# Patient Record
Sex: FEMALE | Race: WHITE | Employment: STUDENT | URBAN - METROPOLITAN AREA
[De-identification: names, ages, dates, MRNs, and addresses within clinical notes are randomized per-mention and may not be internally consistent; named-entity substitution may affect disease eponyms.]

---
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
## 2024-03-27 ENCOUNTER — APPOINTMENT (OUTPATIENT)
Dept: OBSTETRICS AND GYNECOLOGY | Facility: CLINIC | Age: 19
End: 2024-03-27
Payer: COMMERCIAL

## 2024-04-12 ENCOUNTER — APPOINTMENT (OUTPATIENT)
Dept: OBSTETRICS AND GYNECOLOGY | Facility: CLINIC | Age: 19
End: 2024-04-12
Payer: COMMERCIAL

## 2024-04-25 ENCOUNTER — INITIAL PRENATAL (OUTPATIENT)
Dept: OBSTETRICS AND GYNECOLOGY | Facility: CLINIC | Age: 19
End: 2024-04-25
Payer: COMMERCIAL

## 2024-04-25 ENCOUNTER — APPOINTMENT (OUTPATIENT)
Dept: OBSTETRICS AND GYNECOLOGY | Facility: CLINIC | Age: 19
End: 2024-04-25
Payer: COMMERCIAL

## 2024-04-25 VITALS — BODY MASS INDEX: 23.79 KG/M2 | WEIGHT: 138.6 LBS | DIASTOLIC BLOOD PRESSURE: 63 MMHG | SYSTOLIC BLOOD PRESSURE: 120 MMHG

## 2024-04-25 DIAGNOSIS — Z34.01 PRIMIGRAVIDA IN FIRST TRIMESTER (HHS-HCC): ICD-10-CM

## 2024-04-25 DIAGNOSIS — O21.9 NAUSEA AND VOMITING IN PREGNANCY (HHS-HCC): ICD-10-CM

## 2024-04-25 DIAGNOSIS — Z3A.12 12 WEEKS GESTATION OF PREGNANCY (HHS-HCC): ICD-10-CM

## 2024-04-25 PROCEDURE — 0500F INITIAL PRENATAL CARE VISIT: CPT | Performed by: MIDWIFE

## 2024-04-25 ASSESSMENT — EDINBURGH POSTNATAL DEPRESSION SCALE (EPDS)
I HAVE BEEN ABLE TO LAUGH AND SEE THE FUNNY SIDE OF THINGS: AS MUCH AS I ALWAYS COULD
I HAVE BEEN SO UNHAPPY THAT I HAVE HAD DIFFICULTY SLEEPING: NOT AT ALL
I HAVE BEEN ANXIOUS OR WORRIED FOR NO GOOD REASON: HARDLY EVER
TOTAL SCORE: 1
I HAVE FELT SAD OR MISERABLE: NO, NOT AT ALL
I HAVE FELT SCARED OR PANICKY FOR NO GOOD REASON: NO, NOT AT ALL
I HAVE LOOKED FORWARD WITH ENJOYMENT TO THINGS: AS MUCH AS I EVER DID
THE THOUGHT OF HARMING MYSELF HAS OCCURRED TO ME: NEVER
THINGS HAVE BEEN GETTING ON TOP OF ME: NO, I HAVE BEEN COPING AS WELL AS EVER
I HAVE BLAMED MYSELF UNNECESSARILY WHEN THINGS WENT WRONG: NO, NEVER
I HAVE BEEN SO UNHAPPY THAT I HAVE BEEN CRYING: NO, NEVER

## 2024-04-25 NOTE — PROGRESS NOTES
"S: Presents to office to establish prenatal care with partner, Hermes. They are excited about this pregnancy. Denies h/ o gynecologic surgeries/procedures. Taking PNVs.    Symptoms: Fatigue, nausea with rare vomiting.  LMP: 1/28/2024 --> Not exact, patient reports it was \"sometime that week\"    O: See flow sheets    A: 18yo G1 at 12.4 per unsure LMP     P: Oriented to collaborative practice, visit schedule, [facility] delivery      New OB folder with proof of pregnancy/dental letters      [facility] info/application provided      New OB labs ordered      Dating US      Discussed genetic options --> Will consider      Discussed comfort measures for n/v in pregnancy      Pap not indicated r/t age      RTO in 4 weeks and sooner PRN      Next visit: Review labs, dating US, genetics?  "

## 2024-04-30 ENCOUNTER — HOSPITAL ENCOUNTER (OUTPATIENT)
Dept: RADIOLOGY | Facility: CLINIC | Age: 19
Discharge: HOME | End: 2024-04-30
Payer: MEDICAID

## 2024-04-30 DIAGNOSIS — Z3A.12 12 WEEKS GESTATION OF PREGNANCY (HHS-HCC): ICD-10-CM

## 2024-04-30 PROCEDURE — 76801 OB US < 14 WKS SINGLE FETUS: CPT | Performed by: STUDENT IN AN ORGANIZED HEALTH CARE EDUCATION/TRAINING PROGRAM

## 2024-04-30 PROCEDURE — 76801 OB US < 14 WKS SINGLE FETUS: CPT

## 2024-05-01 ENCOUNTER — DOCUMENTATION (OUTPATIENT)
Dept: OBSTETRICS AND GYNECOLOGY | Facility: HOSPITAL | Age: 19
End: 2024-05-01
Payer: COMMERCIAL

## 2024-05-03 ENCOUNTER — LAB (OUTPATIENT)
Dept: LAB | Facility: LAB | Age: 19
End: 2024-05-03
Payer: COMMERCIAL

## 2024-05-03 ENCOUNTER — LAB REQUISITION (OUTPATIENT)
Dept: LAB | Facility: HOSPITAL | Age: 19
End: 2024-05-03
Payer: COMMERCIAL

## 2024-05-03 DIAGNOSIS — Z3A.12 12 WEEKS GESTATION OF PREGNANCY (HHS-HCC): ICD-10-CM

## 2024-05-03 LAB
ABO GROUP (TYPE) IN BLOOD: NORMAL
ANTIBODY SCREEN: NORMAL
ERYTHROCYTE [DISTWIDTH] IN BLOOD BY AUTOMATED COUNT: 11.9 % (ref 11.5–14.5)
HCT VFR BLD AUTO: 38.9 % (ref 36–46)
HGB BLD-MCNC: 13.1 G/DL (ref 12–16)
MCH RBC QN AUTO: 31.3 PG (ref 26–34)
MCHC RBC AUTO-ENTMCNC: 33.7 G/DL (ref 32–36)
MCV RBC AUTO: 93 FL (ref 80–100)
NRBC BLD-RTO: 0 /100 WBCS (ref 0–0)
PLATELET # BLD AUTO: 190 X10*3/UL (ref 150–450)
RBC # BLD AUTO: 4.18 X10*6/UL (ref 4–5.2)
RH FACTOR (ANTIGEN D): NORMAL
WBC # BLD AUTO: 9.4 X10*3/UL (ref 4.4–11.3)

## 2024-05-03 PROCEDURE — 86803 HEPATITIS C AB TEST: CPT

## 2024-05-03 PROCEDURE — 86850 RBC ANTIBODY SCREEN: CPT

## 2024-05-03 PROCEDURE — 36415 COLL VENOUS BLD VENIPUNCTURE: CPT

## 2024-05-03 PROCEDURE — 86901 BLOOD TYPING SEROLOGIC RH(D): CPT

## 2024-05-03 PROCEDURE — 87800 DETECT AGNT MULT DNA DIREC: CPT

## 2024-05-03 PROCEDURE — 83020 HEMOGLOBIN ELECTROPHORESIS: CPT | Performed by: MIDWIFE

## 2024-05-03 PROCEDURE — 86780 TREPONEMA PALLIDUM: CPT

## 2024-05-03 PROCEDURE — 85027 COMPLETE CBC AUTOMATED: CPT

## 2024-05-03 PROCEDURE — 86900 BLOOD TYPING SEROLOGIC ABO: CPT

## 2024-05-03 PROCEDURE — 87389 HIV-1 AG W/HIV-1&-2 AB AG IA: CPT

## 2024-05-03 PROCEDURE — 87661 TRICHOMONAS VAGINALIS AMPLIF: CPT

## 2024-05-03 PROCEDURE — 86317 IMMUNOASSAY INFECTIOUS AGENT: CPT

## 2024-05-03 PROCEDURE — 83021 HEMOGLOBIN CHROMOTOGRAPHY: CPT

## 2024-05-03 PROCEDURE — 87340 HEPATITIS B SURFACE AG IA: CPT

## 2024-05-04 LAB
C TRACH RRNA SPEC QL NAA+PROBE: NEGATIVE
HBV SURFACE AG SERPL QL IA: NONREACTIVE
HCV AB SER QL: NONREACTIVE
HIV 1+2 AB+HIV1 P24 AG SERPL QL IA: NONREACTIVE
N GONORRHOEA DNA SPEC QL PROBE+SIG AMP: NEGATIVE
REFLEX ADDED, ANEMIA PANEL: NORMAL
RUBV IGG SERPL IA-ACNC: 3.9 IA
RUBV IGG SERPL QL IA: POSITIVE
T VAGINALIS RRNA SPEC QL NAA+PROBE: NEGATIVE
TREPONEMA PALLIDUM IGG+IGM AB [PRESENCE] IN SERUM OR PLASMA BY IMMUNOASSAY: NONREACTIVE

## 2024-05-07 LAB
HEMOGLOBIN A2: 2.8 % (ref 2–3.5)
HEMOGLOBIN A: 97 % (ref 95.8–98)
HEMOGLOBIN F: 0.2 % (ref 0–2)
HEMOGLOBIN IDENTIFICATION INTERPRETATION: NORMAL
PATH REVIEW-HGB IDENTIFICATION: NORMAL

## 2024-05-08 ENCOUNTER — APPOINTMENT (OUTPATIENT)
Dept: RADIOLOGY | Facility: CLINIC | Age: 19
End: 2024-05-08
Payer: COMMERCIAL

## 2024-05-15 ENCOUNTER — HOSPITAL ENCOUNTER (OUTPATIENT)
Dept: RADIOLOGY | Facility: CLINIC | Age: 19
Discharge: HOME | End: 2024-05-15
Payer: MEDICAID

## 2024-05-15 DIAGNOSIS — Z3A.12 12 WEEKS GESTATION OF PREGNANCY (HHS-HCC): ICD-10-CM

## 2024-05-15 PROCEDURE — 76813 OB US NUCHAL MEAS 1 GEST: CPT | Performed by: STUDENT IN AN ORGANIZED HEALTH CARE EDUCATION/TRAINING PROGRAM

## 2024-05-15 PROCEDURE — 76813 OB US NUCHAL MEAS 1 GEST: CPT

## 2024-05-23 ENCOUNTER — ROUTINE PRENATAL (OUTPATIENT)
Dept: OBSTETRICS AND GYNECOLOGY | Facility: CLINIC | Age: 19
End: 2024-05-23
Payer: COMMERCIAL

## 2024-05-23 ENCOUNTER — APPOINTMENT (OUTPATIENT)
Dept: LAB | Facility: LAB | Age: 19
End: 2024-05-23
Payer: COMMERCIAL

## 2024-05-23 VITALS — WEIGHT: 138.2 LBS | SYSTOLIC BLOOD PRESSURE: 104 MMHG | BODY MASS INDEX: 23.72 KG/M2 | DIASTOLIC BLOOD PRESSURE: 60 MMHG

## 2024-05-23 DIAGNOSIS — Z3A.14 14 WEEKS GESTATION OF PREGNANCY (HHS-HCC): ICD-10-CM

## 2024-05-23 DIAGNOSIS — Z34.02 PRIMIGRAVIDA IN SECOND TRIMESTER (HHS-HCC): Primary | ICD-10-CM

## 2024-05-23 PROBLEM — Z34.00 PRIMIGRAVIDA, ANTEPARTUM (HHS-HCC): Status: ACTIVE | Noted: 2024-05-23

## 2024-05-23 NOTE — PROGRESS NOTES
S: MARIELLA Denies vb, lof, abdominal pain, cramping. Requests NIPS.    O: See flow sheets    A: 18yo G1 at 14.6 per 11.4 week US    P: Reviewed BP, weight      Reviewed WNL labs      Reviewed/discussed WNL NT scan      NIPS ordered with instructions --> wants to know gender!      Anatomy scan scheduled 6/25      RTO in 4 weeks and sooner PRN

## 2024-06-07 LAB — SCAN RESULT: NORMAL

## 2024-06-20 ENCOUNTER — APPOINTMENT (OUTPATIENT)
Dept: OBSTETRICS AND GYNECOLOGY | Facility: CLINIC | Age: 19
End: 2024-06-20
Payer: COMMERCIAL

## 2024-06-20 VITALS — WEIGHT: 141.8 LBS | BODY MASS INDEX: 24.34 KG/M2 | DIASTOLIC BLOOD PRESSURE: 67 MMHG | SYSTOLIC BLOOD PRESSURE: 98 MMHG

## 2024-06-20 DIAGNOSIS — Z3A.18 18 WEEKS GESTATION OF PREGNANCY (HHS-HCC): ICD-10-CM

## 2024-06-20 DIAGNOSIS — Z34.00 PRIMIGRAVIDA, ANTEPARTUM (HHS-HCC): Primary | ICD-10-CM

## 2024-06-20 PROBLEM — O26.899 RH NEGATIVE STATE IN ANTEPARTUM PERIOD (HHS-HCC): Status: ACTIVE | Noted: 2024-06-20

## 2024-06-20 PROBLEM — Z67.91 RH NEGATIVE STATE IN ANTEPARTUM PERIOD (HHS-HCC): Status: ACTIVE | Noted: 2024-06-20

## 2024-06-20 PROCEDURE — 0501F PRENATAL FLOW SHEET: CPT | Performed by: MIDWIFE

## 2024-06-20 NOTE — PROGRESS NOTES
S: MARIELLA Denies vb, lof, abdominal pain. Endorses flutters! Reports constipation.    O: See flow sheets    A: 20yo G1 at 18.6 per 11.4 week US    P: Reviewed BP, weight      Reviewed/discussed rrNIPS --> It's a BOY!!      Discussed comfort measures for constipation in pregnancy      Anatomy scan scheduled 6/25      RTO in 4 weeks and sooner PRN      Next visit: Review anatomy

## 2024-06-25 ENCOUNTER — HOSPITAL ENCOUNTER (OUTPATIENT)
Dept: RADIOLOGY | Facility: CLINIC | Age: 19
Discharge: HOME | End: 2024-06-25
Payer: COMMERCIAL

## 2024-06-25 DIAGNOSIS — Z3A.12 12 WEEKS GESTATION OF PREGNANCY (HHS-HCC): ICD-10-CM

## 2024-06-25 PROCEDURE — 76811 OB US DETAILED SNGL FETUS: CPT

## 2024-06-25 PROCEDURE — 76811 OB US DETAILED SNGL FETUS: CPT | Performed by: MEDICAL GENETICS

## 2024-07-18 ENCOUNTER — APPOINTMENT (OUTPATIENT)
Dept: OBSTETRICS AND GYNECOLOGY | Facility: CLINIC | Age: 19
End: 2024-07-18
Payer: COMMERCIAL

## 2024-07-18 VITALS — BODY MASS INDEX: 25.64 KG/M2 | SYSTOLIC BLOOD PRESSURE: 113 MMHG | WEIGHT: 149.4 LBS | DIASTOLIC BLOOD PRESSURE: 74 MMHG

## 2024-07-18 DIAGNOSIS — Z3A.22 22 WEEKS GESTATION OF PREGNANCY (HHS-HCC): ICD-10-CM

## 2024-07-18 DIAGNOSIS — Z34.00 PRIMIGRAVIDA, ANTEPARTUM (HHS-HCC): Primary | ICD-10-CM

## 2024-07-18 DIAGNOSIS — Z67.91 RH NEGATIVE STATE IN ANTEPARTUM PERIOD (HHS-HCC): ICD-10-CM

## 2024-07-18 DIAGNOSIS — O26.899 RH NEGATIVE STATE IN ANTEPARTUM PERIOD (HHS-HCC): ICD-10-CM

## 2024-07-18 PROCEDURE — 0501F PRENATAL FLOW SHEET: CPT | Performed by: MIDWIFE

## 2024-07-18 NOTE — PROGRESS NOTES
S: MARIELLA Denies vb, lof, abdominal pain, cramping. Endorses a lot of movement!    O: See flow sheets    A: 18yo G1 at 22.6 per 11.4 week US      RH negative    P: Reviewed BP, weight      Reviewed/discussed WNL anatomy      Second trimester expectations      RTO in 4 weeks and sooner PRN      Next visit: Order 28 week labs with blood type for rhogam

## 2024-08-15 ENCOUNTER — APPOINTMENT (OUTPATIENT)
Dept: OBSTETRICS AND GYNECOLOGY | Facility: CLINIC | Age: 19
End: 2024-08-15
Payer: COMMERCIAL

## 2024-08-15 VITALS — WEIGHT: 156 LBS | BODY MASS INDEX: 26.78 KG/M2 | DIASTOLIC BLOOD PRESSURE: 72 MMHG | SYSTOLIC BLOOD PRESSURE: 108 MMHG

## 2024-08-15 DIAGNOSIS — O26.899 RH NEGATIVE STATE IN ANTEPARTUM PERIOD (HHS-HCC): ICD-10-CM

## 2024-08-15 DIAGNOSIS — Z3A.26 26 WEEKS GESTATION OF PREGNANCY (HHS-HCC): ICD-10-CM

## 2024-08-15 DIAGNOSIS — Z34.00 PRIMIGRAVIDA, ANTEPARTUM (HHS-HCC): Primary | ICD-10-CM

## 2024-08-15 DIAGNOSIS — Z67.91 RH NEGATIVE STATE IN ANTEPARTUM PERIOD (HHS-HCC): ICD-10-CM

## 2024-08-15 PROCEDURE — 0501F PRENATAL FLOW SHEET: CPT | Performed by: MIDWIFE

## 2024-08-15 ASSESSMENT — EDINBURGH POSTNATAL DEPRESSION SCALE (EPDS)
THINGS HAVE BEEN GETTING ON TOP OF ME: NO, I HAVE BEEN COPING AS WELL AS EVER
I HAVE LOOKED FORWARD WITH ENJOYMENT TO THINGS: AS MUCH AS I EVER DID
I HAVE BEEN SO UNHAPPY THAT I HAVE BEEN CRYING: NO, NEVER
I HAVE BEEN ABLE TO LAUGH AND SEE THE FUNNY SIDE OF THINGS: AS MUCH AS I ALWAYS COULD
I HAVE FELT SCARED OR PANICKY FOR NO GOOD REASON: NO, NOT AT ALL
I HAVE BEEN SO UNHAPPY THAT I HAVE HAD DIFFICULTY SLEEPING: NOT AT ALL
THE THOUGHT OF HARMING MYSELF HAS OCCURRED TO ME: NEVER
I HAVE BLAMED MYSELF UNNECESSARILY WHEN THINGS WENT WRONG: NOT VERY OFTEN
TOTAL SCORE: 2
I HAVE FELT SAD OR MISERABLE: NO, NOT AT ALL
I HAVE BEEN ANXIOUS OR WORRIED FOR NO GOOD REASON: HARDLY EVER

## 2024-08-15 NOTE — PROGRESS NOTES
S: NAYELI. Denies SOL/ROM. Endorses good fetal movement.    O: See flow sheets    A: 20yo G1 at 26.6 per 11.4 week US      RH negative    P: Reviewed BP, weight      28 week folder provided, discussed      Answering service # provided, discussed      28 week labs ordered with instructions      Plans for L/D: Epidural if needed/breastfeeding (Has a pump!)/Yes circ, unknown pediatrician      Pediatrician list provided      Postpartum birth control: Undecided. Counseling provided. Instructed to alert us if she desires a device for postpartum placement      Counseled regarding rhogam and tdap      NAYELI in 2 weeks for rhogam and tdap (patient agreeable)

## 2024-08-19 ENCOUNTER — LAB (OUTPATIENT)
Dept: LAB | Facility: LAB | Age: 19
End: 2024-08-19
Payer: COMMERCIAL

## 2024-08-19 DIAGNOSIS — Z3A.26 26 WEEKS GESTATION OF PREGNANCY (HHS-HCC): ICD-10-CM

## 2024-08-19 LAB
ABO GROUP (TYPE) IN BLOOD: NORMAL
ANTIBODY SCREEN: NORMAL
ERYTHROCYTE [DISTWIDTH] IN BLOOD BY AUTOMATED COUNT: 12.2 % (ref 11.5–14.5)
GLUCOSE 1H P 50 G GLC PO SERPL-MCNC: 152 MG/DL
HCT VFR BLD AUTO: 38.1 % (ref 36–46)
HGB BLD-MCNC: 12.5 G/DL (ref 12–16)
MCH RBC QN AUTO: 31.8 PG (ref 26–34)
MCHC RBC AUTO-ENTMCNC: 32.8 G/DL (ref 32–36)
MCV RBC AUTO: 97 FL (ref 80–100)
NRBC BLD-RTO: 0 /100 WBCS (ref 0–0)
PLATELET # BLD AUTO: 150 X10*3/UL (ref 150–450)
RBC # BLD AUTO: 3.93 X10*6/UL (ref 4–5.2)
RH FACTOR (ANTIGEN D): NORMAL
WBC # BLD AUTO: 11.7 X10*3/UL (ref 4.4–11.3)

## 2024-08-19 PROCEDURE — 86901 BLOOD TYPING SEROLOGIC RH(D): CPT

## 2024-08-19 PROCEDURE — 86780 TREPONEMA PALLIDUM: CPT

## 2024-08-19 PROCEDURE — 86900 BLOOD TYPING SEROLOGIC ABO: CPT

## 2024-08-19 PROCEDURE — 82947 ASSAY GLUCOSE BLOOD QUANT: CPT

## 2024-08-19 PROCEDURE — 36415 COLL VENOUS BLD VENIPUNCTURE: CPT

## 2024-08-19 PROCEDURE — 86850 RBC ANTIBODY SCREEN: CPT

## 2024-08-19 PROCEDURE — 85027 COMPLETE CBC AUTOMATED: CPT

## 2024-08-20 DIAGNOSIS — R73.09 GLUCOSE TOLERANCE TEST ABNORMAL: Primary | ICD-10-CM

## 2024-08-20 LAB
REFLEX ADDED, ANEMIA PANEL: NORMAL
TREPONEMA PALLIDUM IGG+IGM AB [PRESENCE] IN SERUM OR PLASMA BY IMMUNOASSAY: NONREACTIVE

## 2024-08-21 ENCOUNTER — LAB (OUTPATIENT)
Dept: LAB | Facility: LAB | Age: 19
End: 2024-08-21
Payer: COMMERCIAL

## 2024-08-21 DIAGNOSIS — R73.09 GLUCOSE TOLERANCE TEST ABNORMAL: ICD-10-CM

## 2024-08-21 LAB
GLUCOSE 1H P 100 G GLC PO SERPL-MCNC: 184 MG/DL
GLUCOSE 2H P 100 G GLC PO SERPL-MCNC: 79 MG/DL
GLUCOSE 3H P 100 G GLC PO SERPL-MCNC: 40 MG/DL
GLUCOSE P FAST SERPL-MCNC: 66 MG/DL

## 2024-08-21 PROCEDURE — 82951 GLUCOSE TOLERANCE TEST (GTT): CPT

## 2024-08-21 PROCEDURE — 36415 COLL VENOUS BLD VENIPUNCTURE: CPT

## 2024-08-21 PROCEDURE — 82950 GLUCOSE TEST: CPT

## 2024-08-21 PROCEDURE — 82947 ASSAY GLUCOSE BLOOD QUANT: CPT

## 2024-08-21 PROCEDURE — 82952 GTT-ADDED SAMPLES: CPT

## 2024-08-29 ENCOUNTER — APPOINTMENT (OUTPATIENT)
Dept: OBSTETRICS AND GYNECOLOGY | Facility: CLINIC | Age: 19
End: 2024-08-29
Payer: COMMERCIAL

## 2024-08-29 VITALS — WEIGHT: 161.6 LBS | BODY MASS INDEX: 27.74 KG/M2 | SYSTOLIC BLOOD PRESSURE: 108 MMHG | DIASTOLIC BLOOD PRESSURE: 73 MMHG

## 2024-08-29 DIAGNOSIS — Z67.91 RH NEGATIVE STATE IN ANTEPARTUM PERIOD (HHS-HCC): Primary | ICD-10-CM

## 2024-08-29 DIAGNOSIS — O26.899 RH NEGATIVE STATE IN ANTEPARTUM PERIOD (HHS-HCC): Primary | ICD-10-CM

## 2024-08-29 NOTE — PROGRESS NOTES
Patient presents for Rhogam injection. Patient received rhogam in right glut. Patient tolerated well.  Lot#[id number]  EXP:09/21/2026  NDC:[id number]

## 2024-09-12 ENCOUNTER — APPOINTMENT (OUTPATIENT)
Dept: OBSTETRICS AND GYNECOLOGY | Facility: CLINIC | Age: 19
End: 2024-09-12
Payer: COMMERCIAL

## 2024-09-12 ENCOUNTER — ROUTINE PRENATAL (OUTPATIENT)
Dept: OBSTETRICS AND GYNECOLOGY | Facility: CLINIC | Age: 19
End: 2024-09-12
Payer: COMMERCIAL

## 2024-09-12 VITALS — DIASTOLIC BLOOD PRESSURE: 81 MMHG | SYSTOLIC BLOOD PRESSURE: 114 MMHG | BODY MASS INDEX: 28.43 KG/M2 | WEIGHT: 165.6 LBS

## 2024-09-12 DIAGNOSIS — Z71.85 VACCINE COUNSELING: ICD-10-CM

## 2024-09-12 DIAGNOSIS — Z34.03 ENCOUNTER FOR SUPERVISION OF NORMAL FIRST PREGNANCY IN THIRD TRIMESTER (HHS-HCC): ICD-10-CM

## 2024-09-12 DIAGNOSIS — Z3A.30 30 WEEKS GESTATION OF PREGNANCY (HHS-HCC): Primary | ICD-10-CM

## 2024-09-12 DIAGNOSIS — Z23 NEED FOR VACCINATION: ICD-10-CM

## 2024-09-12 PROCEDURE — 90715 TDAP VACCINE 7 YRS/> IM: CPT

## 2024-09-12 PROCEDURE — 90471 IMMUNIZATION ADMIN: CPT

## 2024-09-12 PROCEDURE — 0501F PRENATAL FLOW SHEET: CPT

## 2024-09-12 NOTE — PROGRESS NOTES
Subjective     Stacie Alston is a 19 y.o.  at 30w6d with a working estimated date of delivery of 11/15/2024, by Ultrasound who presents for a routine prenatal visit. She denies vaginal bleeding, leakage of fluid, decreased fetal movements, or contractions.    Patient reports overall feeling well. Patient scheduled for bump to bundle class this weekend.     Stacie Alston was counseled on the recommendation for and benefits of TDaP vaccination during pregnancy.  We discussed the passive immunity that occurs after maternal vaccination during pregnancy, and the antibodies that cross the placenta to the fetus to protect baby in the first few months of life.  Babies do not receive their first vaccination for pertussis/whooping cough until 2 months of life, during which the baby is vulnerable to this bacterial infection.  Whooping cough can cause severe and even life-threatening infections, and maternal vaccination between 27 and 36 weeks of pregnancy is the best method to protect baby from Whooping cough until they are eligible for the vaccination. After discussion the patient accepted the vaccine. >5 minutes were spent on counseling related to vaccine recommendations and safety    Patient received Rhogam       Her pregnancy is complicated by:  Medical Problems       Problem List       Primigravida, antepartum ([facility])    Rh negative state in antepartum period ([facility])          Objective   Weight: 75.1 kg (165 lb 9.6 oz)  TW.1 kg (35 lb 9.6 oz)  Pregravid BMI: 22.30    BP: 114/81          Prenatal Labs:  Lab Results   Component Value Date    HGB 12.5 2024    HCT 38.1 2024     2024    ABO A 2024    LABRH NEG 2024    NEISSGONOAMP Negative 2024    CHLAMTRACAMP Negative 2024    SYPHT Nonreactive 2024    HEPBSAG Nonreactive 2024    HIV1X2 Nonreactive 2024    URINECULTURE NO GROWTH 2023       Assessment/Plan   Birth preferences given to  patient today- collect at next visit  Review of one hour-elevated, 3 hour wnl. No anemia noted on cbc  Patient counseled on tdap and vaccine given today  Patient counseled regarding RSV vaccine and vis sent through [employer]   Reviewed s/sx of PTL, warning signs, fetal movement counts, and when to call provider  Follow up in 2 weeks for a routine prenatal visit.    Hailey Chapman, KENRICK-RENAE

## 2024-09-17 ENCOUNTER — APPOINTMENT (OUTPATIENT)
Dept: OBSTETRICS AND GYNECOLOGY | Facility: CLINIC | Age: 19
End: 2024-09-17
Payer: COMMERCIAL

## 2024-09-25 ENCOUNTER — CLINICAL SUPPORT (OUTPATIENT)
Dept: OBSTETRICS AND GYNECOLOGY | Facility: CLINIC | Age: 19
End: 2024-09-25
Payer: MEDICAID

## 2024-09-25 VITALS — DIASTOLIC BLOOD PRESSURE: 72 MMHG | WEIGHT: 166 LBS | SYSTOLIC BLOOD PRESSURE: 112 MMHG | BODY MASS INDEX: 28.49 KG/M2

## 2024-09-25 DIAGNOSIS — Z34.00 PRIMIGRAVIDA, ANTEPARTUM (HHS-HCC): ICD-10-CM

## 2024-09-25 DIAGNOSIS — O26.899 RH NEGATIVE STATE IN ANTEPARTUM PERIOD (HHS-HCC): ICD-10-CM

## 2024-09-25 DIAGNOSIS — Z23 ENCOUNTER FOR VACCINATION: ICD-10-CM

## 2024-09-25 DIAGNOSIS — Z67.91 RH NEGATIVE STATE IN ANTEPARTUM PERIOD (HHS-HCC): ICD-10-CM

## 2024-09-26 ENCOUNTER — APPOINTMENT (OUTPATIENT)
Dept: OBSTETRICS AND GYNECOLOGY | Facility: CLINIC | Age: 19
End: 2024-09-26
Payer: COMMERCIAL

## 2024-09-26 VITALS — WEIGHT: 166.4 LBS | SYSTOLIC BLOOD PRESSURE: 108 MMHG | BODY MASS INDEX: 28.56 KG/M2 | DIASTOLIC BLOOD PRESSURE: 74 MMHG

## 2024-09-26 DIAGNOSIS — Z34.03 PRIMIGRAVIDA IN THIRD TRIMESTER (HHS-HCC): Primary | ICD-10-CM

## 2024-09-26 DIAGNOSIS — Z3A.32 32 WEEKS GESTATION OF PREGNANCY (HHS-HCC): ICD-10-CM

## 2024-09-26 PROCEDURE — 0501F PRENATAL FLOW SHEET: CPT | Performed by: MIDWIFE

## 2024-09-26 NOTE — PROGRESS NOTES
S: NAYELI. Denies sol/rom. Endorses good fetal movement.    O: See flow sheets    A: 18yo G1 at 32.6 per 11.4 week US    P: Reviewed BP, weight      Flu Vaccine: Declined      Discussed third trimester expectations, comfort measures      Birth preferences reviewed together and collected      RTO in 2 weeks and sooner PRN

## 2024-10-09 ENCOUNTER — APPOINTMENT (OUTPATIENT)
Dept: OBSTETRICS AND GYNECOLOGY | Facility: CLINIC | Age: 19
End: 2024-10-09
Payer: COMMERCIAL

## 2024-10-09 VITALS — BODY MASS INDEX: 29.25 KG/M2 | WEIGHT: 170.4 LBS | SYSTOLIC BLOOD PRESSURE: 113 MMHG | DIASTOLIC BLOOD PRESSURE: 70 MMHG

## 2024-10-09 DIAGNOSIS — Z34.03 PRIMIGRAVIDA IN THIRD TRIMESTER (HHS-HCC): Primary | ICD-10-CM

## 2024-10-09 DIAGNOSIS — Z3A.34 34 WEEKS GESTATION OF PREGNANCY (HHS-HCC): ICD-10-CM

## 2024-10-09 NOTE — PROGRESS NOTES
S: NAYELI. Denies sol/rom. Endorses good fetal movement and mild jr anderson that come and go.    O: See flow sheets    A: 18yo G1 at 34.5 per 11.4 week US    P: Reviewed BP, weight      Discussed normal physiologic changes in pregnancy, comfort measures      Encouraged hydration      RTO in 2 weeks and sooner PRN      Next visit: Consent, GBS (no pcn allergy)

## 2024-10-10 ENCOUNTER — APPOINTMENT (OUTPATIENT)
Dept: OBSTETRICS AND GYNECOLOGY | Facility: CLINIC | Age: 19
End: 2024-10-10
Payer: COMMERCIAL

## 2024-10-24 ENCOUNTER — APPOINTMENT (OUTPATIENT)
Dept: OBSTETRICS AND GYNECOLOGY | Facility: CLINIC | Age: 19
End: 2024-10-24
Payer: COMMERCIAL

## 2024-10-24 VITALS — WEIGHT: 173.6 LBS | DIASTOLIC BLOOD PRESSURE: 69 MMHG | BODY MASS INDEX: 29.8 KG/M2 | SYSTOLIC BLOOD PRESSURE: 114 MMHG

## 2024-10-24 DIAGNOSIS — Z34.03 PRIMIGRAVIDA IN THIRD TRIMESTER (HHS-HCC): Primary | ICD-10-CM

## 2024-10-24 DIAGNOSIS — Z3A.36 36 WEEKS GESTATION OF PREGNANCY (HHS-HCC): ICD-10-CM

## 2024-10-24 PROCEDURE — 0501F PRENATAL FLOW SHEET: CPT | Performed by: MIDWIFE

## 2024-10-24 PROCEDURE — 87081 CULTURE SCREEN ONLY: CPT

## 2024-10-24 PROCEDURE — 87077 CULTURE AEROBIC IDENTIFY: CPT

## 2024-10-24 NOTE — PROGRESS NOTES
S: MARIELLA Denies sol/rom. Endorses good FM.     O: See flow sheets    A: 20yo G1 at 36.6 per 11.4 week US    P: Reviewed BP, weight      E-consent signed      GBS done --> no pcn allergy      Counseled rrIOL vs spontaneous labor. Patient strongly desires awaiting spontaneous labor at this time      RTO in 1 week and sooner PRN      Next visit: Review GBS

## 2024-10-27 LAB — GP B STREP GENITAL QL CULT: ABNORMAL

## 2024-10-31 ENCOUNTER — APPOINTMENT (OUTPATIENT)
Dept: OBSTETRICS AND GYNECOLOGY | Facility: CLINIC | Age: 19
End: 2024-10-31
Payer: COMMERCIAL

## 2024-10-31 VITALS — WEIGHT: 177.6 LBS | SYSTOLIC BLOOD PRESSURE: 103 MMHG | DIASTOLIC BLOOD PRESSURE: 70 MMHG | BODY MASS INDEX: 30.48 KG/M2

## 2024-10-31 DIAGNOSIS — O09.513 PRIMIGRAVIDA OF ADVANCED MATERNAL AGE IN THIRD TRIMESTER (HHS-HCC): ICD-10-CM

## 2024-10-31 DIAGNOSIS — Z3A.37 37 WEEKS GESTATION OF PREGNANCY (HHS-HCC): Primary | ICD-10-CM

## 2024-10-31 PROCEDURE — 0501F PRENATAL FLOW SHEET: CPT | Performed by: MIDWIFE

## 2024-11-07 ENCOUNTER — APPOINTMENT (OUTPATIENT)
Dept: OBSTETRICS AND GYNECOLOGY | Facility: CLINIC | Age: 19
End: 2024-11-07
Payer: MEDICAID

## 2024-11-07 VITALS — WEIGHT: 178 LBS | BODY MASS INDEX: 30.55 KG/M2 | DIASTOLIC BLOOD PRESSURE: 75 MMHG | SYSTOLIC BLOOD PRESSURE: 112 MMHG

## 2024-11-07 DIAGNOSIS — Z3A.38 38 WEEKS GESTATION OF PREGNANCY (HHS-HCC): ICD-10-CM

## 2024-11-07 DIAGNOSIS — Z34.03 PRIMIGRAVIDA IN THIRD TRIMESTER (HHS-HCC): Primary | ICD-10-CM

## 2024-11-07 PROCEDURE — 99213 OFFICE O/P EST LOW 20 MIN: CPT | Performed by: MIDWIFE

## 2024-11-07 NOTE — PROGRESS NOTES
S: NAYELI. Reports intermittent contractions and belly tightening. Denies vb, lof. Endorses good fetal movement.    O :See flow sheets      SVE: at least 1cm (very posterior, can't pull it forward, the head is getting low!)/70%/-1, soft    A: 20yo G1 at 38.6 per 11.4 week US    P: Reviewed BP, weight      Discussed SOL/ROM      Encouraged ambulation, showers, discussed contraction timing      Expectant management      RTO in 1 week and sooner PRN

## 2024-11-12 ENCOUNTER — ANESTHESIA (OUTPATIENT)
Dept: OBSTETRICS AND GYNECOLOGY | Facility: HOSPITAL | Age: 19
End: 2024-11-12
Payer: MEDICAID

## 2024-11-12 ENCOUNTER — ANESTHESIA EVENT (OUTPATIENT)
Dept: OBSTETRICS AND GYNECOLOGY | Facility: HOSPITAL | Age: 19
End: 2024-11-12
Payer: MEDICAID

## 2024-11-12 ENCOUNTER — HOSPITAL ENCOUNTER (INPATIENT)
Facility: HOSPITAL | Age: 19
LOS: 2 days | Discharge: HOME | End: 2024-11-14
Attending: STUDENT IN AN ORGANIZED HEALTH CARE EDUCATION/TRAINING PROGRAM | Admitting: ADVANCED PRACTICE MIDWIFE
Payer: MEDICAID

## 2024-11-12 DIAGNOSIS — R52 POSTPARTUM PAIN (HHS-HCC): ICD-10-CM

## 2024-11-12 DIAGNOSIS — K59.00 CONSTIPATION, UNSPECIFIED CONSTIPATION TYPE: Primary | ICD-10-CM

## 2024-11-12 PROBLEM — Z67.91 RH NEGATIVE STATE IN ANTEPARTUM PERIOD (HHS-HCC): Status: RESOLVED | Noted: 2024-06-20 | Resolved: 2024-11-12

## 2024-11-12 PROBLEM — O26.899 RH NEGATIVE STATE IN ANTEPARTUM PERIOD (HHS-HCC): Status: RESOLVED | Noted: 2024-06-20 | Resolved: 2024-11-12

## 2024-11-12 LAB
ABO GROUP (TYPE) IN BLOOD: NORMAL
ANTIBODY SCREEN: NORMAL
ERYTHROCYTE [DISTWIDTH] IN BLOOD BY AUTOMATED COUNT: 12.2 % (ref 11.5–14.5)
HCT VFR BLD AUTO: 39.7 % (ref 36–46)
HGB BLD-MCNC: 13.2 G/DL (ref 12–16)
MCH RBC QN AUTO: 31.4 PG (ref 26–34)
MCHC RBC AUTO-ENTMCNC: 33.2 G/DL (ref 32–36)
MCV RBC AUTO: 95 FL (ref 80–100)
NRBC BLD-RTO: 0 /100 WBCS (ref 0–0)
PLATELET # BLD AUTO: 128 X10*3/UL (ref 150–450)
RBC # BLD AUTO: 4.2 X10*6/UL (ref 4–5.2)
RH FACTOR (ANTIGEN D): NORMAL
WBC # BLD AUTO: 15.4 X10*3/UL (ref 4.4–11.3)

## 2024-11-12 PROCEDURE — 59050 FETAL MONITOR W/REPORT: CPT

## 2024-11-12 PROCEDURE — 36415 COLL VENOUS BLD VENIPUNCTURE: CPT | Performed by: ADVANCED PRACTICE MIDWIFE

## 2024-11-12 PROCEDURE — 86901 BLOOD TYPING SEROLOGIC RH(D): CPT | Performed by: ADVANCED PRACTICE MIDWIFE

## 2024-11-12 PROCEDURE — 3700000014 EPIDURAL BLOCK: Performed by: NURSE ANESTHETIST, CERTIFIED REGISTERED

## 2024-11-12 PROCEDURE — 01967 NEURAXL LBR ANES VAG DLVR: CPT | Performed by: NURSE ANESTHETIST, CERTIFIED REGISTERED

## 2024-11-12 PROCEDURE — 59409 OBSTETRICAL CARE: CPT | Performed by: ADVANCED PRACTICE MIDWIFE

## 2024-11-12 PROCEDURE — 7210000002 HC LABOR PER HOUR

## 2024-11-12 PROCEDURE — 2500000001 HC RX 250 WO HCPCS SELF ADMINISTERED DRUGS (ALT 637 FOR MEDICARE OP): Performed by: ADVANCED PRACTICE MIDWIFE

## 2024-11-12 PROCEDURE — 51701 INSERT BLADDER CATHETER: CPT

## 2024-11-12 PROCEDURE — 1220000001 HC OB SEMI-PRIVATE ROOM DAILY

## 2024-11-12 PROCEDURE — 85027 COMPLETE CBC AUTOMATED: CPT | Performed by: ADVANCED PRACTICE MIDWIFE

## 2024-11-12 PROCEDURE — 2500000004 HC RX 250 GENERAL PHARMACY W/ HCPCS (ALT 636 FOR OP/ED): Performed by: ADVANCED PRACTICE MIDWIFE

## 2024-11-12 PROCEDURE — 7100000016 HC LABOR RECOVERY PER HOUR

## 2024-11-12 PROCEDURE — 2500000004 HC RX 250 GENERAL PHARMACY W/ HCPCS (ALT 636 FOR OP/ED): Performed by: NURSE ANESTHETIST, CERTIFIED REGISTERED

## 2024-11-12 PROCEDURE — 86780 TREPONEMA PALLIDUM: CPT | Mod: STJLAB | Performed by: ADVANCED PRACTICE MIDWIFE

## 2024-11-12 PROCEDURE — 0HQ9XZZ REPAIR PERINEUM SKIN, EXTERNAL APPROACH: ICD-10-PCS | Performed by: ADVANCED PRACTICE MIDWIFE

## 2024-11-12 RX ORDER — TRANEXAMIC ACID 100 MG/ML
1000 INJECTION, SOLUTION INTRAVENOUS ONCE AS NEEDED
Status: ACTIVE | OUTPATIENT
Start: 2024-11-12 | End: 2024-11-13

## 2024-11-12 RX ORDER — CARBOPROST TROMETHAMINE 250 UG/ML
250 INJECTION, SOLUTION INTRAMUSCULAR ONCE AS NEEDED
Status: DISCONTINUED | OUTPATIENT
Start: 2024-11-12 | End: 2024-11-14 | Stop reason: HOSPADM

## 2024-11-12 RX ORDER — METHYLERGONOVINE MALEATE 0.2 MG/ML
0.2 INJECTION INTRAVENOUS ONCE AS NEEDED
Status: DISCONTINUED | OUTPATIENT
Start: 2024-11-12 | End: 2024-11-12

## 2024-11-12 RX ORDER — OXYTOCIN 10 [USP'U]/ML
10 INJECTION, SOLUTION INTRAMUSCULAR; INTRAVENOUS ONCE AS NEEDED
Status: DISCONTINUED | OUTPATIENT
Start: 2024-11-12 | End: 2024-11-12

## 2024-11-12 RX ORDER — ONDANSETRON HYDROCHLORIDE 2 MG/ML
4 INJECTION, SOLUTION INTRAVENOUS EVERY 6 HOURS PRN
Status: DISCONTINUED | OUTPATIENT
Start: 2024-11-12 | End: 2024-11-12

## 2024-11-12 RX ORDER — LIDOCAINE HYDROCHLORIDE AND EPINEPHRINE 15; 5 MG/ML; UG/ML
INJECTION, SOLUTION EPIDURAL AS NEEDED
Status: DISCONTINUED | OUTPATIENT
Start: 2024-11-12 | End: 2024-11-12

## 2024-11-12 RX ORDER — OXYTOCIN/0.9 % SODIUM CHLORIDE 30/500 ML
60 PLASTIC BAG, INJECTION (ML) INTRAVENOUS
Status: DISCONTINUED | OUTPATIENT
Start: 2024-11-12 | End: 2024-11-12

## 2024-11-12 RX ORDER — HYDRALAZINE HYDROCHLORIDE 20 MG/ML
5 INJECTION INTRAMUSCULAR; INTRAVENOUS ONCE AS NEEDED
Status: DISCONTINUED | OUTPATIENT
Start: 2024-11-12 | End: 2024-11-14 | Stop reason: HOSPADM

## 2024-11-12 RX ORDER — SODIUM CHLORIDE, SODIUM LACTATE, POTASSIUM CHLORIDE, CALCIUM CHLORIDE 600; 310; 30; 20 MG/100ML; MG/100ML; MG/100ML; MG/100ML
75 INJECTION, SOLUTION INTRAVENOUS CONTINUOUS
Status: DISCONTINUED | OUTPATIENT
Start: 2024-11-12 | End: 2024-11-12

## 2024-11-12 RX ORDER — OXYTOCIN 10 [USP'U]/ML
10 INJECTION, SOLUTION INTRAMUSCULAR; INTRAVENOUS ONCE AS NEEDED
Status: DISCONTINUED | OUTPATIENT
Start: 2024-11-12 | End: 2024-11-14 | Stop reason: HOSPADM

## 2024-11-12 RX ORDER — LABETALOL HYDROCHLORIDE 5 MG/ML
20 INJECTION, SOLUTION INTRAVENOUS ONCE AS NEEDED
Status: DISCONTINUED | OUTPATIENT
Start: 2024-11-12 | End: 2024-11-14 | Stop reason: HOSPADM

## 2024-11-12 RX ORDER — NIFEDIPINE 10 MG/1
10 CAPSULE ORAL ONCE AS NEEDED
Status: DISCONTINUED | OUTPATIENT
Start: 2024-11-12 | End: 2024-11-12

## 2024-11-12 RX ORDER — SIMETHICONE 80 MG
80 TABLET,CHEWABLE ORAL 4 TIMES DAILY PRN
Status: DISCONTINUED | OUTPATIENT
Start: 2024-11-12 | End: 2024-11-14 | Stop reason: HOSPADM

## 2024-11-12 RX ORDER — DIPHENHYDRAMINE HCL 25 MG
25 TABLET ORAL EVERY 6 HOURS PRN
Status: DISCONTINUED | OUTPATIENT
Start: 2024-11-12 | End: 2024-11-14 | Stop reason: HOSPADM

## 2024-11-12 RX ORDER — ADHESIVE BANDAGE
10 BANDAGE TOPICAL
Status: DISCONTINUED | OUTPATIENT
Start: 2024-11-12 | End: 2024-11-14 | Stop reason: HOSPADM

## 2024-11-12 RX ORDER — METOCLOPRAMIDE HYDROCHLORIDE 5 MG/ML
10 INJECTION INTRAMUSCULAR; INTRAVENOUS EVERY 6 HOURS PRN
Status: DISCONTINUED | OUTPATIENT
Start: 2024-11-12 | End: 2024-11-12

## 2024-11-12 RX ORDER — METOCLOPRAMIDE 10 MG/1
10 TABLET ORAL EVERY 6 HOURS PRN
Status: DISCONTINUED | OUTPATIENT
Start: 2024-11-12 | End: 2024-11-12

## 2024-11-12 RX ORDER — POLYETHYLENE GLYCOL 3350 17 G/17G
17 POWDER, FOR SOLUTION ORAL 2 TIMES DAILY PRN
Status: DISCONTINUED | OUTPATIENT
Start: 2024-11-12 | End: 2024-11-14 | Stop reason: HOSPADM

## 2024-11-12 RX ORDER — LABETALOL HYDROCHLORIDE 5 MG/ML
20 INJECTION, SOLUTION INTRAVENOUS ONCE AS NEEDED
Status: DISCONTINUED | OUTPATIENT
Start: 2024-11-12 | End: 2024-11-12

## 2024-11-12 RX ORDER — NIFEDIPINE 10 MG/1
10 CAPSULE ORAL ONCE AS NEEDED
Status: DISCONTINUED | OUTPATIENT
Start: 2024-11-12 | End: 2024-11-14 | Stop reason: HOSPADM

## 2024-11-12 RX ORDER — LIDOCAINE 560 MG/1
1 PATCH PERCUTANEOUS; TOPICAL; TRANSDERMAL
Status: DISCONTINUED | OUTPATIENT
Start: 2024-11-12 | End: 2024-11-14 | Stop reason: HOSPADM

## 2024-11-12 RX ORDER — PENICILLIN G 3000000 [IU]/50ML
3 INJECTION, SOLUTION INTRAVENOUS EVERY 4 HOURS
Status: DISCONTINUED | OUTPATIENT
Start: 2024-11-12 | End: 2024-11-12

## 2024-11-12 RX ORDER — ONDANSETRON HYDROCHLORIDE 2 MG/ML
4 INJECTION, SOLUTION INTRAVENOUS EVERY 6 HOURS PRN
Status: DISCONTINUED | OUTPATIENT
Start: 2024-11-12 | End: 2024-11-14 | Stop reason: HOSPADM

## 2024-11-12 RX ORDER — TRANEXAMIC ACID 100 MG/ML
1000 INJECTION, SOLUTION INTRAVENOUS ONCE AS NEEDED
Status: DISCONTINUED | OUTPATIENT
Start: 2024-11-12 | End: 2024-11-12

## 2024-11-12 RX ORDER — ACETAMINOPHEN 325 MG/1
975 TABLET ORAL EVERY 6 HOURS SCHEDULED
Status: DISCONTINUED | OUTPATIENT
Start: 2024-11-12 | End: 2024-11-14 | Stop reason: HOSPADM

## 2024-11-12 RX ORDER — TERBUTALINE SULFATE 1 MG/ML
0.25 INJECTION SUBCUTANEOUS ONCE AS NEEDED
Status: DISCONTINUED | OUTPATIENT
Start: 2024-11-12 | End: 2024-11-12

## 2024-11-12 RX ORDER — BISACODYL 10 MG/1
10 SUPPOSITORY RECTAL DAILY PRN
Status: DISCONTINUED | OUTPATIENT
Start: 2024-11-12 | End: 2024-11-14 | Stop reason: HOSPADM

## 2024-11-12 RX ORDER — LIDOCAINE HYDROCHLORIDE 10 MG/ML
30 INJECTION, SOLUTION INFILTRATION; PERINEURAL ONCE AS NEEDED
Status: DISCONTINUED | OUTPATIENT
Start: 2024-11-12 | End: 2024-11-12

## 2024-11-12 RX ORDER — IBUPROFEN 600 MG/1
600 TABLET ORAL EVERY 6 HOURS SCHEDULED
Status: DISCONTINUED | OUTPATIENT
Start: 2024-11-12 | End: 2024-11-14 | Stop reason: HOSPADM

## 2024-11-12 RX ORDER — MISOPROSTOL 200 UG/1
800 TABLET ORAL ONCE AS NEEDED
Status: DISCONTINUED | OUTPATIENT
Start: 2024-11-12 | End: 2024-11-14 | Stop reason: HOSPADM

## 2024-11-12 RX ORDER — CARBOPROST TROMETHAMINE 250 UG/ML
250 INJECTION, SOLUTION INTRAMUSCULAR ONCE AS NEEDED
Status: DISCONTINUED | OUTPATIENT
Start: 2024-11-12 | End: 2024-11-12

## 2024-11-12 RX ORDER — ONDANSETRON 4 MG/1
4 TABLET, FILM COATED ORAL EVERY 6 HOURS PRN
Status: DISCONTINUED | OUTPATIENT
Start: 2024-11-12 | End: 2024-11-12

## 2024-11-12 RX ORDER — MISOPROSTOL 200 UG/1
800 TABLET ORAL ONCE AS NEEDED
Status: DISCONTINUED | OUTPATIENT
Start: 2024-11-12 | End: 2024-11-12

## 2024-11-12 RX ORDER — LOPERAMIDE HYDROCHLORIDE 2 MG/1
4 CAPSULE ORAL EVERY 2 HOUR PRN
Status: DISCONTINUED | OUTPATIENT
Start: 2024-11-12 | End: 2024-11-12

## 2024-11-12 RX ORDER — HYDRALAZINE HYDROCHLORIDE 20 MG/ML
5 INJECTION INTRAMUSCULAR; INTRAVENOUS ONCE AS NEEDED
Status: DISCONTINUED | OUTPATIENT
Start: 2024-11-12 | End: 2024-11-12

## 2024-11-12 RX ORDER — WATER
125 LIQUID (ML) MISCELLANEOUS
Status: DISCONTINUED | OUTPATIENT
Start: 2024-11-12 | End: 2024-11-12

## 2024-11-12 RX ORDER — METHYLERGONOVINE MALEATE 0.2 MG/ML
0.2 INJECTION INTRAVENOUS ONCE AS NEEDED
Status: DISCONTINUED | OUTPATIENT
Start: 2024-11-12 | End: 2024-11-14 | Stop reason: HOSPADM

## 2024-11-12 RX ORDER — DIPHENHYDRAMINE HYDROCHLORIDE 50 MG/ML
25 INJECTION INTRAMUSCULAR; INTRAVENOUS EVERY 6 HOURS PRN
Status: DISCONTINUED | OUTPATIENT
Start: 2024-11-12 | End: 2024-11-14 | Stop reason: HOSPADM

## 2024-11-12 RX ORDER — LOPERAMIDE HYDROCHLORIDE 2 MG/1
4 CAPSULE ORAL EVERY 2 HOUR PRN
Status: DISCONTINUED | OUTPATIENT
Start: 2024-11-12 | End: 2024-11-14 | Stop reason: HOSPADM

## 2024-11-12 RX ORDER — ONDANSETRON 4 MG/1
4 TABLET, FILM COATED ORAL EVERY 6 HOURS PRN
Status: DISCONTINUED | OUTPATIENT
Start: 2024-11-12 | End: 2024-11-14 | Stop reason: HOSPADM

## 2024-11-12 RX ORDER — FENTANYL/ROPIVACAINE/NS/PF 2MCG/ML-.2
0-25 PLASTIC BAG, INJECTION (ML) INJECTION CONTINUOUS
Status: DISCONTINUED | OUTPATIENT
Start: 2024-11-12 | End: 2024-11-12

## 2024-11-12 RX ORDER — MINERAL OIL
OIL (ML) ORAL
Status: DISPENSED
Start: 2024-11-12 | End: 2024-11-13

## 2024-11-12 SDOH — SOCIAL STABILITY: SOCIAL INSECURITY: HAVE YOU HAD THOUGHTS OF HARMING ANYONE ELSE?: NO

## 2024-11-12 SDOH — HEALTH STABILITY: MENTAL HEALTH: NON-SPECIFIC ACTIVE SUICIDAL THOUGHTS (PAST 1 MONTH): NO

## 2024-11-12 SDOH — SOCIAL STABILITY: SOCIAL INSECURITY
WITHIN THE LAST YEAR, HAVE YOU BEEN KICKED, HIT, SLAPPED, OR OTHERWISE PHYSICALLY HURT BY YOUR PARTNER OR EX-PARTNER?: NO

## 2024-11-12 SDOH — SOCIAL STABILITY: SOCIAL INSECURITY: ARE YOU OR HAVE YOU BEEN THREATENED OR ABUSED PHYSICALLY, EMOTIONALLY, OR SEXUALLY BY ANYONE?: NO

## 2024-11-12 SDOH — ECONOMIC STABILITY: FOOD INSECURITY: WITHIN THE PAST 12 MONTHS, YOU WORRIED THAT YOUR FOOD WOULD RUN OUT BEFORE YOU GOT THE MONEY TO BUY MORE.: NEVER TRUE

## 2024-11-12 SDOH — SOCIAL STABILITY: SOCIAL INSECURITY: ABUSE SCREEN: ADULT

## 2024-11-12 SDOH — HEALTH STABILITY: MENTAL HEALTH: HAVE YOU USED ANY SUBSTANCES (CANABIS, COCAINE, HEROIN, HALLUCINOGENS, INHALANTS, ETC.) IN THE PAST 12 MONTHS?: NO

## 2024-11-12 SDOH — HEALTH STABILITY: MENTAL HEALTH: SUICIDAL BEHAVIOR (LIFETIME): NO

## 2024-11-12 SDOH — SOCIAL STABILITY: SOCIAL INSECURITY: WITHIN THE LAST YEAR, HAVE YOU BEEN HUMILIATED OR EMOTIONALLY ABUSED IN OTHER WAYS BY YOUR PARTNER OR EX-PARTNER?: NO

## 2024-11-12 SDOH — HEALTH STABILITY: MENTAL HEALTH: CURRENT SMOKER: 0

## 2024-11-12 SDOH — SOCIAL STABILITY: SOCIAL INSECURITY: HAS ANYONE EVER THREATENED TO HURT YOUR FAMILY OR YOUR PETS?: NO

## 2024-11-12 SDOH — HEALTH STABILITY: MENTAL HEALTH: WERE YOU ABLE TO COMPLETE ALL THE BEHAVIORAL HEALTH SCREENINGS?: YES

## 2024-11-12 SDOH — SOCIAL STABILITY: SOCIAL INSECURITY: VERBAL ABUSE: DENIES

## 2024-11-12 SDOH — SOCIAL STABILITY: SOCIAL INSECURITY: DO YOU FEEL ANYONE HAS EXPLOITED OR TAKEN ADVANTAGE OF YOU FINANCIALLY OR OF YOUR PERSONAL PROPERTY?: NO

## 2024-11-12 SDOH — HEALTH STABILITY: MENTAL HEALTH: HAVE YOU USED ANY PRESCRIPTION DRUGS OTHER THAN PRESCRIBED IN THE PAST 12 MONTHS?: NO

## 2024-11-12 SDOH — SOCIAL STABILITY: SOCIAL INSECURITY: ARE THERE ANY APPARENT SIGNS OF INJURIES/BEHAVIORS THAT COULD BE RELATED TO ABUSE/NEGLECT?: NO

## 2024-11-12 SDOH — ECONOMIC STABILITY: FOOD INSECURITY: WITHIN THE PAST 12 MONTHS, THE FOOD YOU BOUGHT JUST DIDN'T LAST AND YOU DIDN'T HAVE MONEY TO GET MORE.: NEVER TRUE

## 2024-11-12 SDOH — HEALTH STABILITY: MENTAL HEALTH: WISH TO BE DEAD (PAST 1 MONTH): NO

## 2024-11-12 SDOH — SOCIAL STABILITY: SOCIAL INSECURITY: DOES ANYONE TRY TO KEEP YOU FROM HAVING/CONTACTING OTHER FRIENDS OR DOING THINGS OUTSIDE YOUR HOME?: NO

## 2024-11-12 SDOH — SOCIAL STABILITY: SOCIAL INSECURITY: PHYSICAL ABUSE: DENIES

## 2024-11-12 SDOH — SOCIAL STABILITY: SOCIAL INSECURITY
WITHIN THE LAST YEAR, HAVE YOU BEEN RAPED OR FORCED TO HAVE ANY KIND OF SEXUAL ACTIVITY BY YOUR PARTNER OR EX-PARTNER?: NO

## 2024-11-12 SDOH — SOCIAL STABILITY: SOCIAL INSECURITY: WITHIN THE LAST YEAR, HAVE YOU BEEN AFRAID OF YOUR PARTNER OR EX-PARTNER?: NO

## 2024-11-12 SDOH — SOCIAL STABILITY: SOCIAL INSECURITY: HAVE YOU HAD ANY THOUGHTS OF HARMING ANYONE ELSE?: NO

## 2024-11-12 SDOH — ECONOMIC STABILITY: HOUSING INSECURITY: DO YOU FEEL UNSAFE GOING BACK TO THE PLACE WHERE YOU ARE LIVING?: NO

## 2024-11-12 ASSESSMENT — PAIN SCALES - GENERAL
PAINLEVEL_OUTOF10: 5 - MODERATE PAIN
PAINLEVEL_OUTOF10: 8
PAINLEVEL_OUTOF10: 5 - MODERATE PAIN
PAINLEVEL_OUTOF10: 0 - NO PAIN
PAINLEVEL_OUTOF10: 3
PAINLEVEL_OUTOF10: 0 - NO PAIN

## 2024-11-12 ASSESSMENT — LIFESTYLE VARIABLES
AUDIT-C TOTAL SCORE: 0
HOW MANY STANDARD DRINKS CONTAINING ALCOHOL DO YOU HAVE ON A TYPICAL DAY: PATIENT DOES NOT DRINK
AUDIT-C TOTAL SCORE: 0
HOW OFTEN DO YOU HAVE 6 OR MORE DRINKS ON ONE OCCASION: NEVER
HOW OFTEN DO YOU HAVE A DRINK CONTAINING ALCOHOL: NEVER
SKIP TO QUESTIONS 9-10: 1

## 2024-11-12 ASSESSMENT — ACTIVITIES OF DAILY LIVING (ADL): LACK_OF_TRANSPORTATION: NO

## 2024-11-12 ASSESSMENT — PATIENT HEALTH QUESTIONNAIRE - PHQ9
SUM OF ALL RESPONSES TO PHQ9 QUESTIONS 1 & 2: 0
1. LITTLE INTEREST OR PLEASURE IN DOING THINGS: NOT AT ALL
2. FEELING DOWN, DEPRESSED OR HOPELESS: NOT AT ALL

## 2024-11-12 NOTE — ANESTHESIA PREPROCEDURE EVALUATION
Patient: Stacie Alston    Evaluation Method: In-person visit    Procedure Information    Date: 24  Procedure: Labor Consult       19 y.o.  at 39w4d. Admitted in labor.    Patient hoping for NCB with N20; labor epidural upon request.    Patient has never had GA in the past; patient denies family h/o problems with GA.    Relevant Problems   Gravid and    (+) Labor without complication ([facility])       Clinical information reviewed:   Tobacco  Allergies  Meds  Problems  Med Hx  Surg Hx   Fam Hx  Soc   Hx        NPO Detail:  Clear liquid diet after epidural placement per L&D protocol.     OB/Gyn Evaluation    Present Pregnancy    Patient is pregnant now.   Obstetric History                Physical Exam    Airway  Mallampati: II  TM distance: >3 FB  Neck ROM: full     Cardiovascular - normal exam  Rhythm: regular  Rate: normal     Dental - normal exam     Pulmonary - normal exam  Breath sounds clear to auscultation     Abdominal - normal exam    Comments: gravid           Anesthesia Plan    History of general anesthesia?: no  History of complications of general anesthesia?: unknown/emergency    ASA 2     epidural     The patient is not a current smoker.    Anesthetic plan and risks discussed with patient.  Use of blood products discussed with patient who consented to blood products.      I informed and discussed the risks and benefits of general, spinal and epidural anesthesia with the patient.  The patient expressed her understanding and her questions were answered.  A verbal consent was given by the patient.

## 2024-11-12 NOTE — H&P
OB Admission H&P    Assessment/Plan    Stacie Alston is a 19 y.o.  at 39w4d, AJ: 11/15/2024, by Ultrasound, who is admitted for Labor.    Plan   -Admit to L&D, consented  -T&S, CBC, and Syphilis  -Epidural at patient request  -Recheck as clinically indicated by maternal or fetal status    Fetal Status  -NST reactive, reassuring   -Presentation vertex based on vaginal exam and Leopold's maneuver  -EFW 7#12 by Leopold's  -GBS positive    Postpartum  Contraception Plan:  undecided    Pregnancy Problems (from 24 to present)       Problem Noted Diagnosed Resolved    Primigravida, antepartum ([facility]) 2024 by KRISTINE Morales  No    Priority:  Medium       Overview Signed 10/24/2024  8:33 AM by KRISTINE Morales     Desired provider in labor: [x] CNM  [] Physician  [x] Initial BMI: 22  [x] Prenatal Labs: Done  [x] Rh status: A-  [x] Genetic Screening:  rrnips --> It's a boy!  [x] NT US: WNL  [x] Baby ASA (if indicated): n/a  [x] Pregnancy dated by: 11.4 week US  [x] Anatomy US: WNL  [] Patient added to BirthTracks  [x] 1hr GCT at 24-28wks: fail  [x] 3 hr GTT (if indicated): passed  [x] Rhogam (if indicated):   [x] Fetal Surveillance (if indicated): n/a  [x] Tdap (27-36wks): 24  [x] RSV (32-36wks) ( Sept. To end of  ): Done  [x] Flu Shot: declines  [x] COVID vaccine: Declined  [x] Breastfeeding:  [x] Postpartum Birth control method: Counseled. Undecided.  [] GBS at 36 - 37 wks: done 10/24  [x] 39 weeks discussion of IOL vs. Expectant management: expectant  [x] Mode of delivery:          Rh negative state in antepartum period ([facility]) 2024 by KRISTINE Morales  2024 by KRISTINE Ramirez    Priority:  Medium               Subjective   Good fetal movement.  Having contractions q 3 minutes.      Prenatal Provider Krystal Mccray CNM    OB History    Para Term  AB Living   1 0 0 0 0 0   SAB IAB Ectopic Multiple Live Births   0 0  0 0 0      # Outcome Date GA Lbr Everardo/2nd Weight Sex Type Anes PTL Lv   1 Current                No past surgical history on file.    Social History     Tobacco Use    Smoking status: Never    Smokeless tobacco: Never   Substance Use Topics    Alcohol use: Never       Allergies   Allergen Reactions    Latex Unknown       No medications prior to admission.     Objective     Last Vitals  Temp Pulse Resp BP MAP O2 Sat                   Blood Pressures    No data found in the last 1 encounters.          Physical Exam  General: NAD, mood appropriate  Cardiopulmonary: warm and well perfused, breathing comfortably on room air  Abdomen: Gravid, non-tender  Extremities: Symmetric  Speculum Exam: not indicated due to gross rupture of membranes, deferred  Cervix:   /  /       Fetal Monitoring  Baseline: 138 bpm, Variability: moderate,  Accelerations: present and Decelerations: none  Uterine Activity: q2-4' minutes  Interpretation: Category 1    Bedside ultrasound: not needed    Labs in chart were reviewed.  Admit labs pending           Prenatal labs reviewed, not remarkable.

## 2024-11-12 NOTE — L&D DELIVERY NOTE
OB Delivery Note  2024  Stacie Feldmanchristy  19 y.o.   Vaginal, Spontaneous       Gestational Age: 39w4d  /Para:   Quantitative Blood Loss: Admission to Discharge: 345 mL (2024  2:42 PM - 2024  7:58 PM)    Gamaliel Joshua [[id number]]      Labor Events    Sac identifier: Sac 1  Rupture date/time: 2024 1400  Rupture type: Spontaneous  Fluid color: Clear  Fluid odor: None  Labor type: Spontaneous Onset of Labor  Complications: None       Labor Event Times    Labor onset date/time: 2024 1400  Dilation complete date/time: 2024  Start pushing date/time: 2024       Placenta    Placenta delivery date/time: 2024 174  Placenta removal: Spontaneous  Placenta appearance: Intact  Placenta disposition: discarded       Cord    Vessels: 3 vessels  Complications: None  Delayed cord clamping?: Yes  Cord clamped date/time: 2024 17:36:00  Cord blood disposition: Lab  Gases sent?: No  Stem cell collection (by provider): No       Lacerations    Episiotomy: None  Perineal laceration: None  Labial laceration?: Yes  Labial laceration location: right  Labial laceration repaired?: Yes  Repair suture: 3-0 Monocryl       Anesthesia    Method: Epidural       Operative Delivery    Forceps attempted?: No  Vacuum extractor attempted?: No       Shoulder Dystocia    Shoulder dystocia present?: No        Delivery    Birth date/time: 2024 17:33:00  Delivery type: Vaginal, Spontaneous  Complications: None       Resuscitation    Method: Tactile stimulation, Suctioning       Apgars    Living status: Living  Apgar Component Scores:  1 min.:  5 min.:  10 min.:  15 min.:  20 min.:    Skin color:  1  1       Heart rate:  2  2       Reflex irritability:  2  2       Muscle tone:  1  2       Respiratory effort:  2  2       Total:  8  9       Apgars assigned by: ROOSEVELT       Delivery Providers    Delivering clinician: KRISTINE Ramirez   Provider Role     Korinne Kathryn Becks, RN Delivery Nurse    Brandon Darden, RN Nursery Nurse             CNM delivery note--quick progress in second stage; spontaneous vaginal birth of living baby boy who breathed and cried immediately and is handed to kangaroo care; pitocin 10uIM given for third stage per protocol;  bladder emptied; placenta spontaneous and appears intact; fundus firm with massage; right labial laceration repaired with 3 interrupted sutures--good hemostasis; QBL 200cc; ice pack to perineum.        KENRICK Ramirez-CNM

## 2024-11-12 NOTE — CARE PLAN
The patient's goals for the shift include progress labor    The clinical goals for the shift include FHR remains reassuring during labor      Problem: Vaginal Birth or  Section  Goal: Fetal and maternal status remain reassuring during the birth process  2024 by Korinne Kathryn Becks, RN  Outcome: Met  Flowsheets (Taken 2024)  Fetal and maternal status remain reassuring during the birth process:   Monitor vital signs   Deep vein thrombosis prophylaxis   Med administration/monitoring of effect   Monitor fetal heart rate   Monitor uterine activity   Monitor labor progression (Vaginal delivery)  2024 151 by Korinne Kathryn Becks, RN  Outcome: Progressing  Goal: Prevention of malpresentation/labor dystocia through delivery  2024 by Korinne Kathryn Becks, RN  Outcome: Met  Flowsheets (Taken 2024)  Prevention of malpresentation/labor dystocia through delivery: Positioning, turning, and/or use of birthing ball assistance  2024 151 by Korinne Kathryn Becks, RN  Outcome: Progressing  Goal: Demonstrates labor coping techniques through delivery  2024 by Korinne Kathryn Becks, RN  Outcome: Met  Flowsheets (Taken 2024)  Demonstrates labor coping techniques through delivery:   Positioning, turning, and/or use of birthing ball assistance   Breathing/relaxation assistance  2024 151 by Korinne Kathryn Becks, RN  Outcome: Progressing  Goal: Minimal s/sx of HDP and BP<160/110  2024 by Korinne Kathryn Becks, RN  Outcome: Met  Flowsheets (Taken 2024)  Minimal s/sx of HDP and BP <160/110:   Med administration/monitoring of effect   Monitor QBL and vital signs  2024 151 by Korinne Kathryn Becks, RN  Outcome: Progressing  Goal: No s/sx of infection through recovery  2024 by Korinne Kathryn Becks, RN  Outcome: Progressing  Flowsheets (Taken 2024)  No s/sx of infection through recovery:   Hygiene  practices/tara-care   Monitor QBL and vital signs  11/12/2024 1515 by Korinne Kathryn Becks, RN  Outcome: Progressing  Goal: No s/sx of hemorrhage through recovery  11/12/2024 1814 by Korinne Kathryn Becks, RN  Outcome: Progressing  Flowsheets (Taken 11/12/2024 1814)  No s/sx of hemorrhage through recovery: Monitor QBL and vital signs  11/12/2024 1515 by Korinne Kathryn Becks, RN  Outcome: Progressing     Problem: Pain - Adult  Goal: Verbalizes/displays adequate comfort level or baseline comfort level  11/12/2024 1814 by Korinne Kathryn Becks, RN  Outcome: Progressing  11/12/2024 1515 by Korinne Kathryn Becks, RN  Outcome: Progressing     Problem: Safety - Adult  Goal: Free from fall injury  11/12/2024 1814 by Korinne Kathryn Becks, RN  Outcome: Progressing  11/12/2024 1515 by Korinne Kathryn Becks, RN  Outcome: Progressing     Problem: Discharge Planning  Goal: Discharge to home or other facility with appropriate resources  11/12/2024 1814 by Korinne Kathryn Becks, RN  Outcome: Progressing  11/12/2024 1515 by Korinne Kathryn Becks, RN  Outcome: Progressing

## 2024-11-12 NOTE — ANESTHESIA PROCEDURE NOTES
Epidural Block    Patient location during procedure: OB  Start time: 11/12/2024 4:15 PM  End time: 11/12/2024 4:30 PM  Reason for block: labor analgesia  Staffing  Performed: CRNA   Authorized by: JOSEFA Mar    Performed by: JOSEFA Mar    Preanesthetic Checklist  Completed: patient identified, IV checked, risks and benefits discussed, surgical consent, monitors and equipment checked, pre-op evaluation, timeout performed and sterile techniques followed  Block Timeout  RN/Licensed healthcare professional reads aloud to the Anesthesia provider and entire team: Patient identity, procedure with side and site, patient position, and as applicable the availability of implants/special equipment/special requirements.  Patient on coagulant treatment: no  Timeout performed at: 11/12/2024 4:19 PM  Block Placement  Patient position: sitting  Prep: ChloraPrep  Sterility prep: cap, drape, gloves, hand and mask  Sedation level: no sedation  Patient monitoring: blood pressure, continuous pulse oximetry and heart rate  Approach: midline  Local numbing: lidocaine 1% to skin and subcutaneous tissues  Vertebral space: lumbar  Lumbar location: L3-L4  Epidural  Loss of resistance technique: saline  Guidance: landmark technique        Needle  Needle type: Tuohy   Needle gauge: 17  Needle length: 8.9cm  Needle insertion depth: 5 cm  Catheter type: end hole  Catheter size: 19 G  Catheter at skin depth: 10 cm  Catheter securement method: clear occlusive dressing    Test dose: lidocaine 1.5% with epinephrine 1-to-200,000  Test dose: lidocaine 1.5% with epinephrine 1-to-200,000  Test dose result: no positive test dose    PCEA  Medication concentration used: 0.2% Ropivacaine with 2 mcg/mL Fentanyl  Dose (mL): 5  Lockout (minutes): 20  1-Hour Limit (boluses/hr): 3  Basal Rate: 10        Assessment bilateral  Block outcome: pain improved  Number of attempts: 1  Events: no positive test dose and No adverse events;  Negative CSF/heme/paresthesias  Procedure assessment: patient tolerated procedure well with no immediate complications

## 2024-11-13 PROBLEM — Z34.00 PRIMIGRAVIDA, ANTEPARTUM (HHS-HCC): Status: RESOLVED | Noted: 2024-05-23 | Resolved: 2024-11-13

## 2024-11-13 LAB — TREPONEMA PALLIDUM IGG+IGM AB [PRESENCE] IN SERUM OR PLASMA BY IMMUNOASSAY: NONREACTIVE

## 2024-11-13 PROCEDURE — 1220000001 HC OB SEMI-PRIVATE ROOM DAILY

## 2024-11-13 PROCEDURE — 2500000001 HC RX 250 WO HCPCS SELF ADMINISTERED DRUGS (ALT 637 FOR MEDICARE OP): Performed by: ADVANCED PRACTICE MIDWIFE

## 2024-11-13 PROCEDURE — 2500000004 HC RX 250 GENERAL PHARMACY W/ HCPCS (ALT 636 FOR OP/ED): Performed by: ADVANCED PRACTICE MIDWIFE

## 2024-11-13 RX ORDER — IBUPROFEN 600 MG/1
600 TABLET ORAL EVERY 6 HOURS PRN
Qty: 120 TABLET | Refills: 0 | Status: SHIPPED | OUTPATIENT
Start: 2024-11-13

## 2024-11-13 RX ORDER — DOCUSATE SODIUM 100 MG/1
100 CAPSULE, LIQUID FILLED ORAL 2 TIMES DAILY PRN
Qty: 60 CAPSULE | Refills: 0 | Status: SHIPPED | OUTPATIENT
Start: 2024-11-13

## 2024-11-13 ASSESSMENT — PAIN DESCRIPTION - LOCATION
LOCATION: ABDOMEN
LOCATION: ABDOMEN

## 2024-11-13 ASSESSMENT — PAIN SCALES - GENERAL
PAINLEVEL_OUTOF10: 1
PAINLEVEL_OUTOF10: 1
PAINLEVEL_OUTOF10: 0 - NO PAIN
PAINLEVEL_OUTOF10: 2
PAINLEVEL_OUTOF10: 0 - NO PAIN
PAINLEVEL_OUTOF10: 1
PAINLEVEL_OUTOF10: 2

## 2024-11-13 ASSESSMENT — PAIN DESCRIPTION - DESCRIPTORS: DESCRIPTORS: SORE

## 2024-11-13 NOTE — ANESTHESIA POSTPROCEDURE EVALUATION
Patient: Stacie Alston    Procedure Summary       Date: 11/12/24 Room / Location:     Anesthesia Start: 1615 Anesthesia Stop: 1733    Procedure: Labor Analgesia Diagnosis:     Scheduled Providers:  Responsible Provider: JOSEFA Mar    Anesthesia Type: epidural ASA Status: 2            Anesthesia Type: epidural    Vitals Value Taken Time   /71 11/13/24 0350   Temp 36.8 11/13/24 0350   Pulse 68 11/13/24 0350   Resp 18 11/13/24 0350   SpO2 97 11/13/24 0350       Anesthesia Post Evaluation    Patient location during evaluation: bedside  Patient participation: complete - patient participated  Level of consciousness: awake and alert  Pain management: satisfactory to patient  Multimodal analgesia pain management approach  Airway patency: patent  Cardiovascular status: acceptable  Respiratory status: acceptable  Hydration status: acceptable  Postoperative Nausea and Vomiting: none  Comments: Epidural catheter removed by nursing. No redness, swelling, or drainage at puncture site.    Complete resolution of numbness. Patient is able to lift legs, bend at the knees, and ambulate.    Patient denies problems with urination.    Patient denies nausea, headache or severe back pain.         There were no known notable events for this encounter.

## 2024-11-13 NOTE — LACTATION NOTE
Lactation Consultant Note  Lactation Consultation  Reason for Consult: Initial assessment  Consultant Name: Dora Sanders    Maternal Information  Has mother  before?: No  Infant to breast within first 2 hours of birth?: Yes  Exclusive Pump and Bottle Feed: No  WIC Program: Yes    Maternal Assessment  Breast Assessment: Medium, Soft  Nipple Assessment: Intact  Areola Assessment: Normal    Infant Assessment  Infant Behavior: Awake, Sucking    Feeding Assessment  Nutrition Source: Breastmilk  Feeding Method: Nursing at the breast, Feeding expressed breastmilk, Spoon feeding  Feeding Position: Cradle, Skin to skin, Mother demonstrates good positioning  Suck/Feeding: Sustained, Baby led rhythmically, Audible swallowing  Latch Assessment: Latch achieved, Wide open mouth < 160, Bursts of sucking, swallowing, and rest, Comfortable latch    LATCH TOOL  Latch: Grasps breast, tongue down, lips flanged, rhythmic sucking  Audible Swallowing: Spontaneous and intermittent (24 hours old)  Type of Nipple: Everted (After stimulation)  Comfort (Breast/Nipple): Soft/non-tender  Hold (Positioning): No assist from staff, mother able to position/hold infant  LATCH Score: 10    Breast Pump  Pump: Hand expression  Frequency: Other (comment) (As needed for poor feeding)  Duration: Less than 15 minutes per session  Units of Volume: Drops    Other OB Lactation Tools       Patient Follow-up  Inpatient Lactation Follow-up Needed : Yes  Outpatient Lactation Follow-up: Recommended    Other OB Lactation Documentation       Recommendations/Summary  , 39.4 weeks, @1733. Birthweight 3550g. TCB 0.5@10 hours. Parents report breastfeeding/latching is going well. Endorse infant has been sleepy for some feeds, Rns assisted with HE and spoon feeding.  Infant latched at breast in cradle hold at time of consult. Infant tucked close and facing mother with good alignment and chin off chest. Wide gape, bursts of sucking, swallowing, and rest.  Mother endorses latch comfortable.     Plan:  Cue based feeding, at least 8-12 times in 24 hours  Frequent skin to skin  Hand express for extra volume  Call for assistance as needed    Educated parents on skin to skin, hand expression, hunger cues and feeding frequency/patterns. Discussed expected output, weight loss <10%, and normal bilirubin. Educated on AAP pacifier recommendations. Reviewed outpatient resources.    Follow up, 1900: [facility] rounding. Parents changing diaper for transitional stool. Mother reports infant sleepy since feeding at 1100, has been HE and spoon feeding. Infant awake and rooting/fussing with diaper change. Suck assessed. Biting noted. Facial massage, allowed infant to chomp on gloved finger, suck training done. Suck improved. Posterior frenulum tight, palpable. Taught ABC's of good positioning: arms around breast, infant belly to belly with parent, infant's curve of hip to parent's curve of body. Taught to have infant rest their chin on the breast below the areola. Parents instructed to wait for gape reflex elicited by chin pressure on the breast, before hugging infant close to facilitate latching. Parents demonstrate technique with minimal assistance from IBCLC to time latching. Infant able to latch deeply with wide gape and sustained rhythmical sucking.    Anticipatory guidance given on cluster feeding

## 2024-11-13 NOTE — PROGRESS NOTES
Spiritual Care Visit    Clinical Encounter Type  Visited With: Patient  Routine Visit: Introduction  Continue Visiting: No                                            Taxonomy  Intended Effects: Promote sense of peace, Preserve dignity and respect, Meaning-making  Methods: Offer spiritual/Uatsdin support  Interventions: Share words of hope and inspiration, [place], Provide a Uatsdin item(s)     congratulated the patient on her first baby.   gave her a baby Bible and prayed a blessing for her baby at her request.

## 2024-11-13 NOTE — PROGRESS NOTES
Postpartum Progress Note    Subjective   Stacie Alston is a 19 y.o., , who delivered at 39w4d gestation and is now postpartum day 1.  Feeling well, no concerns, some mild cramping occasionally  Her pain is well controlled with current medications  She is passing flatus  She is ambulating well  She is tolerating a Adult diet Regular  She reports no breast or nursing problems  She denies emotional concerns today        Active Problems:     (normal spontaneous vaginal delivery) ([facility])    Pregnancy Problems (from 24 to present)       Problem Noted Diagnosed Resolved     (normal spontaneous vaginal delivery) ([facility]) 2024 by KRISTINE Sinclair  No    Priority:  Medium       Primigravida, antepartum ([facility]) 2024 by KRISTINE Morales  2024 by KRISTINE Sinclair    Priority:  Medium       Overview Signed 10/24/2024  8:33 AM by KRISTINE Morales     Desired provider in labor: [x] CNM  [] Physician  [x] Initial BMI: 22  [x] Prenatal Labs: Done  [x] Rh status: A-  [x] Genetic Screening:  rrnips --> It's a boy!  [x] NT US: WNL  [x] Baby ASA (if indicated): n/a  [x] Pregnancy dated by: 11.4 week US  [x] Anatomy US: WNL  [] Patient added to BirthTracks  [x] 1hr GCT at 24-28wks: fail  [x] 3 hr GTT (if indicated): passed  [x] Rhogam (if indicated):   [x] Fetal Surveillance (if indicated): n/a  [x] Tdap (27-36wks): 24  [x] RSV (32-36wks) ( Sept. To end of  ): Done  [x] Flu Shot: declines  [x] COVID vaccine: Declined  [x] Breastfeeding:  [x] Postpartum Birth control method: Counseled. Undecided.  [] GBS at 36 - 37 wks: done 10/24  [x] 39 weeks discussion of IOL vs. Expectant management: expectant  [x] Mode of delivery:          Rh negative state in antepartum period ([facility]) 2024 by KENRICK Morales-RENAE  2024 by KENRICK Ramirez-RENAE          Hospital course: no complications  Vaginal Birth  Patient is currently  breastfeedingThe patient's blood type is A NEG. The baby's blood type is O NEG. Rhogam is not indicated.    Objective   Allergies:   Latex         Last Vitals:  Temp Pulse Resp BP MAP Pulse Ox   36.6 °C (97.9 °F) 86 16 119/78   96 %     Vitals Min/Max Last 24 Hours:  Temp  Min: 36.6 °C (97.9 °F)  Max: 37 °C (98.6 °F)  Pulse  Min: 68  Max: 117  Resp  Min: 16  Max: 18  BP  Min: 105/71  Max: 130/72    Intake/Output:     Intake/Output Summary (Last 24 hours) at 11/13/2024 1031  Last data filed at 11/13/2024 0145  Gross per 24 hour   Intake 5 ml   Output 1745 ml   Net -1740 ml       Physical Exam:  General: Examination reveals a well developed, well nourished, female, in no acute distress. She is alert and cooperative.  HEENT: External ears normal. Nose normal, no erythema or discharge. Mouth and throat clear.  Lungs: normal effort.  Breasts: lactating, no erythema or tenderness, nipples normal.  Fundus: firm and at umbilicus.  Extremities: no redness or tenderness in the calves or thighs, edema 1+.  Psychological: awake and alert; oriented to person, place, and time.    Lab Data:  Lab Results   Component Value Date    WBC 15.4 (H) 11/12/2024    HGB 13.2 11/12/2024    HCT 39.7 11/12/2024     (L) 11/12/2024         Assessment/Plan     Continue routine postpartum care  Pain well controlled on PO medications  Patient has been assessed to have a DVT risk score of  3 , prophylactic lovenox not indicated  Patient is Rh Negative (Rh-)., baby is Rh Negative (Rh-).; Not eligible  Encouraged breastfeeding, lactation consult as needed  Contraception methods discussed, including contraindication for use of estrogen in immediate postpartum period  Appropriate for discharge on PPD #2 if remains stable  Advised patient to follow up in 4-6 weeks with primary OB provider for routine postpartum visit    KRISTINE Sinclair

## 2024-11-13 NOTE — SIGNIFICANT EVENT
CNM update--SEPSIS protocol triggered by patient pulse in the setting of mild WBC elevation; patient pushing during pulse elevation; overall my concern for sepsis is very low.

## 2024-11-13 NOTE — CARE PLAN
The patient's goals for the shift include progress labor    The clinical goals for the shift include Rest and bond with baby      Problem: Pain - Adult  Goal: Verbalizes/displays adequate comfort level or baseline comfort level  Outcome: Progressing  Flowsheets (Taken 2024)  Verbalizes/displays adequate comfort level or baseline comfort level: Encourage patient to monitor pain and request assistance     Problem: Safety - Adult  Goal: Free from fall injury  Outcome: Progressing  Flowsheets (Taken 2024)  Free from fall injury: Instruct family/caregiver on patient safety     Problem: Discharge Planning  Goal: Discharge to home or other facility with appropriate resources  Outcome: Progressing  Flowsheets (Taken 2024)  Discharge to home or other facility with appropriate resources: Identify barriers to discharge with patient and caregiver     Problem: Postpartum  Goal: Experiences normal postpartum course  Outcome: Progressing  Flowsheets (Taken 2024)  Experiences normal postpartum course: Monitor maternal vital signs  Goal: Appropriate maternal -  bonding  Outcome: Progressing  Flowsheets (Taken 2024)  Appropriate maternal- bondin-hour rooming in  Goal: Establish and maintain infant feeding pattern for adequate nutrition  Outcome: Progressing  Flowsheets (Taken 2024)  Establish and maintain infant feeding pattern for adequate nutrition: Assess  human milk feeding  Goal: No s/sx infection  Outcome: Progressing  Flowsheets (Taken 2024)  No s/sx of infection: Hygiene practices/tara-care  Goal: No s/sx of hemorrhage  Outcome: Progressing  Flowsheets (Taken 2024)  No s/sx of hemorrhage: Monitor QBL and vital signs  Goal: Minimal s/sx of HDP and BP<160/110  Outcome: Progressing  Flowsheets (Taken 2024)  Minimal s/sx of HDP and BP <160/110: Med administration/monitoring of effect

## 2024-11-13 NOTE — CARE PLAN
The patient's goals for the shift include bond with baby and breast feed    The clinical goals for the shift include normal vitals signs, normal lochia    Over the shift, the patient did make progress toward the following goals.

## 2024-11-14 ENCOUNTER — APPOINTMENT (OUTPATIENT)
Dept: OBSTETRICS AND GYNECOLOGY | Facility: CLINIC | Age: 19
End: 2024-11-14
Payer: COMMERCIAL

## 2024-11-14 VITALS
TEMPERATURE: 98.2 F | OXYGEN SATURATION: 97 % | BODY MASS INDEX: 30.49 KG/M2 | WEIGHT: 178.57 LBS | HEIGHT: 64 IN | RESPIRATION RATE: 18 BRPM | DIASTOLIC BLOOD PRESSURE: 75 MMHG | HEART RATE: 106 BPM | SYSTOLIC BLOOD PRESSURE: 117 MMHG

## 2024-11-14 PROCEDURE — 2500000001 HC RX 250 WO HCPCS SELF ADMINISTERED DRUGS (ALT 637 FOR MEDICARE OP): Performed by: ADVANCED PRACTICE MIDWIFE

## 2024-11-14 ASSESSMENT — PAIN DESCRIPTION - DESCRIPTORS
DESCRIPTORS: CRAMPING
DESCRIPTORS: CRAMPING

## 2024-11-14 ASSESSMENT — PAIN SCALES - GENERAL
PAINLEVEL_OUTOF10: 2
PAINLEVEL_OUTOF10: 2

## 2024-11-14 NOTE — DISCHARGE SUMMARY
Discharge Summary    Admission Date: 11/12/2024  Discharge Date: 11/14/2024    Discharge Diagnosis  Labor without complication ([facility])  Spontaneous vaginal delivery    Hospital Course  Delivery Date: 11/12/2024 5:33 PM  Delivery type: Vaginal, Spontaneous   GA at delivery: 39w4d  Outcome: Living  Anesthesia during delivery: Epidural  Intrapartum complications: None  Feeding method: Breastfeeding Status: Yes     Procedures: none  Contraception at discharge: discussed options, reviewed contraindication for the use of estrogen in immediate postpartum period. Interested in Nexplanon, will discuss at 4-6 wk postpartum visit.      Pertinent Physical Exam At Time of Discharge    General: Examination reveals a well developed, well nourished, female, in no acute distress. She is alert and cooperative.  Lungs: normal respiratory effort.  Breasts: lactating, no erythema or tenderness, nipples normal.  Abdomen: soft, nontender.  Fundus: firm, below umbilicus, and nontender.  Perineum: well approximated, well healing, and lochia moderate.  Extremities: no redness or tenderness in the calves or thighs, no edema, intact peripheral pulses.  Neurological: alert, oriented, normal speech, no focal findings or movement disorder noted.  Psychological: awake and alert.    Last Vitals:  Temp Pulse Resp BP MAP Pulse Ox   36.8 °C (98.2 °F) 73 18 109/69 82 100 %     Discharge Meds     Your medication list        START taking these medications        Instructions Last Dose Given Next Dose Due   docusate sodium 100 mg capsule  Commonly known as: Colace      Take 1 capsule (100 mg) by mouth 2 times a day as needed for constipation.       ibuprofen 600 mg tablet      Take 1 tablet (600 mg) by mouth every 6 hours if needed for mild pain (1 - 3) or moderate pain (4 - 6).                 Where to Get Your Medications        These medications were sent to [place] [id number] [facility] - [place], [place] - [address]  [address], [place] [address]       Phone: [phone]   docusate sodium 100 mg capsule  ibuprofen 600 mg tablet          Complications Requiring Follow-Up  none    Test Results Pending At Discharge  Pending Labs       No current pending labs.          Stacie's pain is well controlled with medications. Reports cramping with nursing. Witch hazel and tucks are working well. No problems with voiding. She is ambulating well and tolerating a regular diet. Ready for discharge home today. Plans to see lactation prior to discharge.     Stable postpartum day 2 with normal involution. Plan for discharge today. Postpartum precautions reviewed including postpartum depression, postpartum hemorrhage, infection, and s/s of PEC. Patient verbalized understanding.     Outpatient Follow-Up  Patient to call the office to schedule postpartum visit in 4-6 weeks with Krystal Mccray CNM    I spent 30 minutes in the professional and overall care of this patient.      KENRICK Farias-RENAE

## 2024-11-14 NOTE — CARE PLAN
The patient's goals for the shift include bond with infant    The clinical goals for the shift include return to prepregnancy state      Problem: Pain - Adult  Goal: Verbalizes/displays adequate comfort level or baseline comfort level  Outcome: Progressing  Flowsheets (Taken 2024 by Berkley Mejia, RN)  Verbalizes/displays adequate comfort level or baseline comfort level: Encourage patient to monitor pain and request assistance     Problem: Safety - Adult  Goal: Free from fall injury  Outcome: Progressing  Flowsheets (Taken 2024 by Berkley Mejia, RN)  Free from fall injury: Instruct family/caregiver on patient safety     Problem: Discharge Planning  Goal: Discharge to home or other facility with appropriate resources  Outcome: Progressing  Flowsheets (Taken 2024 by Berkley Mejia, RN)  Discharge to home or other facility with appropriate resources: Identify barriers to discharge with patient and caregiver     Problem: Postpartum  Goal: Experiences normal postpartum course  Outcome: Progressing  Flowsheets (Taken 2024 by Berkley Mejia, RN)  Experiences normal postpartum course: Monitor maternal vital signs  Goal: Appropriate maternal -  bonding  Outcome: Progressing  Flowsheets (Taken 2024 by Berkley Mejia, RN)  Appropriate maternal- bondin-hour rooming in

## 2024-11-14 NOTE — LACTATION NOTE
"Lactation Consultant Note  Lactation Consultation      Recommendations/Summary  RN IBCLC in room to offer services at 1330 at Mother should be attempting to feed again at this time. Concerns and prolonged interval between feeds over early morning hours. Peds waiting for discharge for feeding plan. Mother was able to latch NB independently for 25min at 1030 per nursing staff and per mother she latched NB for 10min at 1300. LC not seen latch today. Mother currently STS with NB, no hunger cues noted.  Mother us 21yo    on 24 at 1733 of viable boy \"Francois\" at 39.4 weeks gestation. No risk factors noted for mother. NB BW 3550 down to 3335 ( about 6%). Bili 1.5 at 33HOL. APGAR 8&9.  Reviewed Normal NB feeding, hunger cues, waking techniques and that NB needs to eat every 2-3hours at least. Also reviewed supply and demand. Mother motivated to breastfeed just needs education and encouragement. Mother is very pleasant and motivated, FOB at bedside very supportive.  Discussed Out pt LC services and yellow folder with parents and agreed they would like ref to [facility] out pt LC. Ref made.    Update\" RN IBCLC in room to assist mother, she would like to try to latch in football hold. Mother states NB fed for 10min, came off for 10min and switched and fed for 10min again. Nb showing hunger cues. RN IBCLC assisted in football hold and getting deep latch. Needs to review ABCs of latching and deep latch. Mother needed encouragement to bring NB to breast to assist in deep latch and use breast sandwich technique. Mother will need additional support and education with latch timing. Mother is scheduled at [facility] out " patient                                                                                                                                                                                                                                                                                                                                                                                                                                                                                                                                                                                                                                                                                                                                                                                                                                                                                                                                                                                                                                                                                                                                                                                                                                                                                                                                                                                                                                                                                                                                                                                                                                                                                                                                                                                                                                                                                                                                                                                                                                                                                                                                                                                                                                                                                                                                                                                                                                                                                                                                                                                                                                                                                                                                                                                                                                                                                                                                                                                                                                                                                                                                                                                                                                                                                                                                                                                                                                                                                                                                                                                                                                                                                                                                                                                                                                                               .                                  +......  .

## 2024-11-18 ENCOUNTER — LACTATION CONSULT (OUTPATIENT)
Dept: LACTATION | Facility: CLINIC | Age: 19
End: 2024-11-18
Payer: MEDICAID

## 2024-11-18 DIAGNOSIS — O92.70 LACTATION PROBLEM (HHS-HCC): Primary | ICD-10-CM

## 2024-11-18 PROCEDURE — 99211 OFF/OP EST MAY X REQ PHY/QHP: CPT | Performed by: EMERGENCY MEDICINE

## 2024-11-19 ENCOUNTER — APPOINTMENT (OUTPATIENT)
Dept: LACTATION | Facility: CLINIC | Age: 19
End: 2024-11-19
Payer: MEDICAID

## 2024-11-19 NOTE — PROGRESS NOTES
Lactation Counseling Note    Subjective:    Stacie Alston is a 19 y.o. patient referred for lactation counseling. She is here today due to Engorgement, Sore/cracked nipple(s), and Latch issues. She was referred by her  mother .     OB HISTORY:   Baby Name: Josh  /Para: : 1  Para: 1  Weeks Gestation: 39.5  Mode of Delivery: Normal vaginal route  Delivery Complications: None  Maternal Complications: None  Isabella Information: : 24  Place of Birth: [facility]  Healthcare Provider: Krystal Mccray  Skin to skin contact: First hour  Birth weight: 7lbs 13oz  Birth length:    Discharge weight:    Complications:      Objective:    BREASTFEEDING ASSESSMENT:   Physical Exam    Breast Assessment: Medium, Engorged, Hard , Compressible, and Readiness to feed  Nipple Assessment/Stage: erect, compression stripe, distorted shape or flattened, scabbed, and sore Stage II - Abrasions, shallow crack or fissure, stripe  [place]: Normal  Feeding Position: cradle, skin to skin, both sides, and nipple to nose  Latch: moderate assistance is needed, eagerly grasped on to latch, deep latch obtained, latch is painful, pain during feeding, chin and lower lip contact breast first, flanged lips, minimal audible swallowing, and nipple slides back and forth within baby's mouth  Suck/Feeding: unsustained, audible swallowing with stimulation, nipple shield used, and content after feeding  Alternative Feeding Methods: nursing at the breast and syringe feeding  Infant Behavior: awake, crying, fussy, sucking, and content after feeding  Nipple Pain: Pain scale 0-10 (10 most pain): 7  After adjustment pain 0-10 (10 most pain): 1  Weight: Weight before feedinlbs 1.5oz  Weight after feedinlbs 7.5oz  Amount of breast milk transferred: 3oz ml  Number of voids in the last 24 hours: 8  Number of stools in the last 24 hours: 4-5    PATIENT DISCUSSION SUMMARY: Mom here with difficulty latching referred by mother who came here as well.   Observed mom latching baby mom having increased pain with latch.  Baby's latch was very shallow popping off and on noted a lip and tongue-tie upon oral inspection.  Assisted with application of nipple shield baby latched much deeper mom denies any pain and baby was able to transfer 3 ounces between both breasts.  Mom was pretty severely engorged reviewed breast massage breast gymnastics encouraged mom to maybe pump to empty 2 times a day to hopefully read relieve some of the engorgement reviewed that she had fever chills headache redness swelling pain to call her midwife to rule out mastitis.  Referred patient to her baby's pediatrician for diagnosis and assessment of tongue-tie and referral to pediatric dentist.  Mom to follow-up for weight check at the end of this week.    LACTATION PROBLEMS AND STRATEGIES:  Baby slow weight gain: Baby needs to latch deeply to breast  Do not wait until baby cries to feed. Watch for feeding cues-baby actively moving, mouth open, sucking, eye movement  Follow pediatrician's recommendations on use of supplements  If nipple shield is used, be sure baby using shield correctly and transferring milk  Increase number of feedings per day. Nurse at least 10-12 times per day  Keep all sucking at the breast, avoid use of soothers (pacifiers)  Offer both breasts each feeding  See physician to rule out medical problem  Use breast compression if baby does not suckle actively  Use strategies to boost milk supply. See PI-162 breastfeeding: Tips to Increase Milk Supply  low weight gain: Be sure baby is latching deeply to breast  Do not use a time limit at breast  Eliminate pacifiers and other soothers such as swings  Give supplements if necessary, follow progress closely  Increase frequency of nursing to10-12 times per day  Increase time spent skin to skin with baby  Monitor wet diapers and bowel movements  See physician to rule out illnesses  Try switching nursing techniques  Under 6 months of  age, a normal weight gain is 4-6 ounces per week and 3-5 ounces per week after 6 months of age  Use breast massage and breast compression  Nipple blisters/sores: Apply very warm compress to blister to soften it  Immediately put baby to affected breast after applying compresses  Infectious sore on nipple such as herpes require immediate physician treatment  Pay attention to good positioning and attachment  Review comfort measures for sore nipples once blisters are open  Seek treatment from physician to open blisters if compress treatment fails  White or clear blisters may be caused by a plug or skin blocking milk flow  Sore nipple (any stage): Air dry nipples between feedings.  Check bra for tightness or rough seams.  Discontinue use of creams and ointments.  Discontinue use of soaps or irritating substances.  Express milk before feeding to encourage rapid letdown.  Gave PI Sheet (PI-167) on Sore and Cracked Nipples.  If nipples have eczema, rinse food particles from baby's mouth before nursing.  Review latch on and positioning.  See Sore Nipples/Early for more information.  See physician for unusual rashes or sores on nipples.  Use relaxation and breathing techniques.  Vary nursing positions.  Wash nipple wounds with soapy warm water once per day.  PATIENT INSTRUCTION HANDOUTS GIVEN:      LACTATION EDUCATION:  Correct Positioning and Correct Latch On

## 2024-11-21 ENCOUNTER — APPOINTMENT (OUTPATIENT)
Dept: OBSTETRICS AND GYNECOLOGY | Facility: CLINIC | Age: 19
End: 2024-11-21
Payer: COMMERCIAL

## 2024-11-21 NOTE — PATIENT INSTRUCTIONS
Mom here with difficulty latching referred by mother who came here as well.  Observed mom latching baby mom having increased pain with latch.  Baby's latch was very shallow popping off and on noted a lip and tongue-tie upon oral inspection.  Assisted with application of nipple shield baby latched much deeper mom denies any pain and baby was able to transfer 3 ounces between both breasts.  Mom was pretty severely engorged reviewed breast massage breast gymnastics encouraged mom to maybe pump to empty 2 times a day to hopefully read relieve some of the engorgement reviewed that she had fever chills headache redness swelling pain to call her midwife to rule out mastitis.  Referred patient to her baby's pediatrician for diagnosis and assessment of tongue-tie and referral to pediatric dentist.  Mom to follow-up for weight check at the end of this week.

## 2024-11-25 ENCOUNTER — LACTATION CONSULT (OUTPATIENT)
Dept: LACTATION | Facility: CLINIC | Age: 19
End: 2024-11-25
Payer: MEDICAID

## 2024-11-27 ENCOUNTER — APPOINTMENT (OUTPATIENT)
Dept: OBSTETRICS AND GYNECOLOGY | Facility: CLINIC | Age: 19
End: 2024-11-27
Payer: COMMERCIAL

## 2024-11-27 NOTE — PROGRESS NOTES
Lactation Counseling Note    Subjective:    Stacie Alston is a 19 y.o. patient referred for lactation counseling. She is here today due to Latch issues. She was referred by her  mom .     OB HISTORY:   Mode of Delivery: Normal vaginal route    Objective:    BREASTFEEDING ASSESSMENT:   Physical Exam    Breast Assessment: Medium, Full, Soft, Warm, and Compressible  Nipple Assessment/Stage: intact and erect    Areola: Normal  Feeding Position: cradle, skin to skin, both sides, and mother demonstrates good positioning  Latch: minimal assistance is needed, instructed on deep latch, latch achieved, sucking and swallowing, sucks with long jaw movement, bursts of sucking, swallowing, and rest, flanged lips, comfortable latch, correct tongue position, and frequent audible swallows  Suck/Feeding: sustained, audible swallowing, and content after feeding  Alternative Feeding Methods: nursing at the breast and paced bottle  Infant Feeding Method: Breast milk only  Infant Behavior: awake, active alert, crying, and content after feeding  Infant Information: Post status frenotomy  Receding chin  Poor occlusion of lips around areola  Tongue protrudes over alveolar ridge  Good cupping of tongue  Good lateral movement of the tongue  Nipple Pain: Pain scale 0-10 (10 most pain): 0  Weight: Weight before feedinlbs 8.7oz   Weight after feedinlbs 13.4oz   Amount of breast milk transferred: 3.7oz ml  Number of voids in the last 24 hours: 9  Number of stools in the last 24 hours: 6    PATIENT DISCUSSION SUMMARY: Here for follow-up weight check post revision on Friday baby up 4 ounces in 7 days mom states she got rid of the shield post revision denies any pain or problems with the baby latching observed really good feeding here baby transferred 3.7 ounces mom denies any pain with nursing.  Mom to follow-up next week after the holiday for a weight check.  Mom's engorgement is tolerable resolved no redness warmth or lumps  today.    LACTATION PROBLEMS AND STRATEGIES:  Problems latching baby to breast: Baby should latch to areola (dark area) not just the nipple.  Baby's lips should be flanged outward.  Bring the baby up to the level of your breast by putting a pillow under the baby.  Do not use bottles or pacifiers until latching on well.  Dribble milk over the nipple or express milk so that baby can taste it  Encourage a deeper latch with the asymetrical latch- lining baby's nose opposite mother's nipple.  Encourage nipple to stand up prior to feeing by pumping, nipple rolling or by brief application of ice.  Gently tickle your baby's lip with your nipple to encourage your baby to open his/her mouth wide.  Hold baby so that your breast is positioned deep in the baby's mouth.  Hold your baby close, tummy to tummy.  If baby does not latch to breast, express breast milk.  If engorged, express some milk to soften breast.  If the baby is not latched on well, break seal and gently try again.  Keep baby skin to skin and watch for feeding cues.  Massage breast to start milk-ejection reflex.  Place nipple and at least 1 inch of areola into baby's mouth.  Place your hand behind the baby's neck and shoulder.  Do not force baby's head into breast.  Put baby in the football hold or clutch hold, supporting his/her neck and head in sniffing position to open his/her throat.  Shape breast into oval shape (sandwich hold)  for deep latch.  Try different feeding positions (cradle, football, side etc.).  Use a breast feeding pillow to bring baby up to the level of the breast.  Use nipple shield and monitor baby's weight gain and output.  Use semi-reclined feeding position to allow baby to take an active role and trigger baby's inborn feeding behavior.  Use your hand to support your breast during feeding.  When your baby's mouth is wide open, quickly pull baby into your breast.  Your baby's chin should be pressed into your breast.  PATIENT INSTRUCTION  HANDOUTS GIVEN:      LACTATION EDUCATION:  Correct Positioning and Correct Latch On

## 2024-11-27 NOTE — PATIENT INSTRUCTIONS
Here for follow-up weight check post revision on Friday baby up 4 ounces in 7 days mom states she got rid of the shield post revision denies any pain or problems with the baby latching observed really good feeding here baby transferred 3.7 ounces mom denies any pain with nursing.  Mom to follow-up next week after the holiday for a weight check.  Mom's engorgement is tolerable resolved no redness warmth or lumps today

## 2024-12-03 ENCOUNTER — LACTATION CONSULT (OUTPATIENT)
Dept: LACTATION | Facility: CLINIC | Age: 19
End: 2024-12-03
Payer: MEDICAID

## 2024-12-03 DIAGNOSIS — O92.70 LACTATION PROBLEM (HHS-HCC): Primary | ICD-10-CM

## 2024-12-03 PROCEDURE — 99211 OFF/OP EST MAY X REQ PHY/QHP: CPT | Performed by: EMERGENCY MEDICINE

## 2024-12-03 NOTE — PROGRESS NOTES
Lactation Counseling Note    Subjective:    Stacie Alston is a 19 y.o. patient referred for lactation counseling. She is here today due to Latch issues and weight check . She was referred by her  self .     OB HISTORY:   Baby Name: Francois    Objective:    BREASTFEEDING ASSESSMENT:   Physical Exam    Breast Assessment: Medium, Filling, Compressible, and Readiness to feed  Nipple Assessment/Stage: intact and erect with stimulation without pain  Areola: Normal  Feeding Position: cradle, cross - cradle, both sides, and mother demonstrates good positioning  Latch: instructed on deep latch, deep latch obtained, comfortable with no pain, bursts of sucking, swallowing, and rest, chin moves in rhythmic motion, and frequent audible swallows  Suck/Feeding: sustained, baby led rhythmically, audible swallowing, and content after feeding  Infant Feeding Method: Breast milk only  Infant Behavior: readiness to feed, feeding cues observed, rooting response, and content after feeding  Infant Information: Post status frenotomy  Palate- high/arch/bubble/normal  Good cupping of tongue  Good lateral movement of the tongue  Able to elevate tongue to roof of mouth  Breast Pain: Pain scale 0-10 (10 most pain): 0  Nipple Pain: Pain scale 0-10 (10 most pain): 0  Weight: Weight before feedinlbs 5.5oz  Weight after feedinlbs 9.7oz  Amount of breast milk transferred: 4.2oz ml  Number of voids in the last 24 hours: 6  Number of stools in the last 24 hours: 7    PATIENT DISCUSSION SUMMARY: Mom and baby here accompanied by grandmother for weight check. Mom reports things are much improved since frenotomy, denies further nipple pain with latching almost immediately after procedure. She is very happy with results and enjoying breastfeeding. She reports 6-7 wet diapers and 7 dirty diapers in the last 24 hours. Baby is 3 weeks old today, up a lot nursing during the night a few times, gained 12.8oz in 8 days, mom concerned supply was  dropping, reassured her that her supply seems to be great and big growth spurt at 3 weeks. Advised her to look for reassurance with supply through dirty diapers and continued weight gain. Observed mom latching baby independently, deep latch obtained, no pain. Baby nursed very well and transferred a total of 4.2oz. Mom is no longer using nipple shield post frenotomy, Wound site is healing very well. Mom will continue to pump 2x a day to relieve any engorgement and save milk. She is pumping between 5-8oz. Will follow up here next week for weight check,     LACTATION PROBLEMS AND STRATEGIES:  Problems latching baby to breast: Baby should latch to areola (dark area) not just the nipple.  Baby's lips should be flanged outward.  Bring the baby up to the level of your breast by putting a pillow under the baby.  Do not use bottles or pacifiers until latching on well.  Dribble milk over the nipple or express milk so that baby can taste it  Encourage a deeper latch with the asymetrical latch- lining baby's nose opposite mother's nipple.  Encourage nipple to stand up prior to feeing by pumping, nipple rolling or by brief application of ice.  Gently tickle your baby's lip with your nipple to encourage your baby to open his/her mouth wide.  Hold baby so that your breast is positioned deep in the baby's mouth.  Hold your baby close, tummy to tummy.  If baby does not latch to breast, express breast milk.  If engorged, express some milk to soften breast.  If the baby is not latched on well, break seal and gently try again.  Keep baby skin to skin and watch for feeding cues.  Massage breast to start milk-ejection reflex.  Place nipple and at least 1 inch of areola into baby's mouth.  Place your hand behind the baby's neck and shoulder.  Do not force baby's head into breast.  Put baby in the football hold or clutch hold, supporting his/her neck and head in sniffing position to open his/her throat.  Shape breast into oval shape  (sandwich hold)  for deep latch.  Try different feeding positions (cradle, football, side etc.).  Use a breast feeding pillow to bring baby up to the level of the breast.  Use nipple shield and monitor baby's weight gain and output.  Use semi-reclined feeding position to allow baby to take an active role and trigger baby's inborn feeding behavior.  Use your hand to support your breast during feeding.  When your baby's mouth is wide open, quickly pull baby into your breast.  Your baby's chin should be pressed into your breast.  PATIENT INSTRUCTION HANDOUTS GIVEN:   .  LACTATION EDUCATION:  Correct Positioning and Correct Latch On

## 2024-12-03 NOTE — PATIENT INSTRUCTIONS
Mom and baby here accompanied by grandmother for weight check. Mom reports things are much improved since frenotomy, denies further nipple pain with latching almost immediately after procedure. She is very happy with results and enjoying breastfeeding. She reports 6-7 wet diapers and 7 dirty diapers in the last 24 hours. Baby is 3 weeks old today, up a lot nursing during the night a few times, gained 12.8oz in 8 days, mom concerned supply was dropping, reassured her that her supply seems to be great and big growth spurt at 3 weeks. Advised her to look for reassurance with supply through dirty diapers and continued weight gain. Observed mom latching baby independently, deep latch obtained, no pain. Baby nursed very well and transferred a total of 4.2oz. Mom is no longer using nipple shield post frenotomy, Wound site is healing very well. Mom will continue to pump 2x a day to relieve any engorgement and save milk. She is pumping between 5-8oz. Will follow up here next week for weight check,

## 2024-12-09 ENCOUNTER — LACTATION CONSULT (OUTPATIENT)
Dept: LACTATION | Facility: CLINIC | Age: 19
End: 2024-12-09
Payer: MEDICAID

## 2024-12-09 DIAGNOSIS — O92.70 LACTATION PROBLEM (HHS-HCC): Primary | ICD-10-CM

## 2024-12-09 PROCEDURE — 99211 OFF/OP EST MAY X REQ PHY/QHP: CPT | Performed by: EMERGENCY MEDICINE

## 2024-12-10 NOTE — PATIENT INSTRUCTIONS
Mom and baby here for weight check.  Baby gained 8.8oz in 6 days, which is great. Mom reports baby is nursing frequently throughout the night still and reassured her this is normal and there are a few big growth spurts in the first couple of months and baby is gaining weight perfectly. She reports lots of wet diapers and one dirty diaper in the last 24 hours. No nipple pain/breast pain. Frenotomy site is healing well. Baby nursed well today, gulping at the breast and transferred total of 4oz. Will return next week for weight check. Praised mom for doing a great job and to continue with current plan.

## 2024-12-10 NOTE — PROGRESS NOTES
"Lactation Counseling Note    Subjective:    Stacie Alston is a 19 y.o. patient referred for lactation counseling. She is here today due to  weight check . She was referred by her  self .     OB HISTORY:   Baby Name: Francois    Objective:    BREASTFEEDING ASSESSMENT:   Physical Exam    Breast Assessment: Medium, Symmetrical, Filling, Warm, Compressible, and Readiness to feed  Nipple Assessment/Stage: intact and erect with stimulation without pain  Areola: Normal  Feeding Position: cradle, both sides, and mother demonstrates good positioning  Latch: deep latch obtained, sucking and swallowing, sucks with long jaw movement, and frequent audible swallows  Suck/Feeding: sustained, baby led rhythmically, audible swallowing, choking/gulping, and content after feeding  Infant Behavior: awake, readiness to feed, feeding cues observed, and content after feeding  Infant Information: Post status frenotomy  Palate- high/arch/bubble/normal  Good cupping of tongue  Good lateral movement of the tongue  Able to elevate tongue to roof of mouth  Breast Pain: Pain scale 0-10 (10 most pain): 0  Nipple Pain: Pain scale 0-10 (10 most pain): 0  Weight: Weight before feedinlbs 14.3oz  Weight after feedinlbs 2.3oz  Amount of breast milk transferred: 4oz ml  Number of voids in the last 24 hours: \"every diaper change, a lot\"  Number of stools in the last 24 hours: 1    PATIENT DISCUSSION SUMMARY: Mom and baby here for weight check.  Baby gained 8.8oz in 6 days, which is great. Mom reports baby is nursing frequently throughout the night still and reassured her this is normal and there are a few big growth spurts in the first couple of months and baby is gaining weight perfectly. She reports lots of wet diapers and one dirty diaper in the last 24 hours. No nipple pain/breast pain. Frenotomy site is healing well. Baby nursed well today, gulping at the breast and transferred total of 4oz. Will return next week for weight check. Praised " mom for doing a great job and to continue with current plan.     LACTATION PROBLEMS AND STRATEGIES:  Problems latching baby to breast: Baby should latch to areola (dark area) not just the nipple.  Baby's lips should be flanged outward.  Bring the baby up to the level of your breast by putting a pillow under the baby.  Do not use bottles or pacifiers until latching on well.  Dribble milk over the nipple or express milk so that baby can taste it  Encourage a deeper latch with the asymetrical latch- lining baby's nose opposite mother's nipple.  Encourage nipple to stand up prior to feeing by pumping, nipple rolling or by brief application of ice.  Gently tickle your baby's lip with your nipple to encourage your baby to open his/her mouth wide.  Hold baby so that your breast is positioned deep in the baby's mouth.  Hold your baby close, tummy to tummy.  If baby does not latch to breast, express breast milk.  If engorged, express some milk to soften breast.  If the baby is not latched on well, break seal and gently try again.  Keep baby skin to skin and watch for feeding cues.  Massage breast to start milk-ejection reflex.  Place nipple and at least 1 inch of areola into baby's mouth.  Place your hand behind the baby's neck and shoulder.  Do not force baby's head into breast.  Put baby in the football hold or clutch hold, supporting his/her neck and head in sniffing position to open his/her throat.  Shape breast into oval shape (sandwich hold)  for deep latch.  Try different feeding positions (cradle, football, side etc.).  Use a breast feeding pillow to bring baby up to the level of the breast.  Use nipple shield and monitor baby's weight gain and output.  Use semi-reclined feeding position to allow baby to take an active role and trigger baby's inborn feeding behavior.  Use your hand to support your breast during feeding.  When your baby's mouth is wide open, quickly pull baby into your breast.  Your baby's chin should  be pressed into your breast.  PATIENT INSTRUCTION HANDOUTS GIVEN:   .  LACTATION EDUCATION:  Correct Positioning and Correct Latch On

## 2024-12-16 ENCOUNTER — LACTATION CONSULT (OUTPATIENT)
Dept: LACTATION | Facility: CLINIC | Age: 19
End: 2024-12-16
Payer: MEDICAID

## 2024-12-16 PROCEDURE — 99211 OFF/OP EST MAY X REQ PHY/QHP: CPT | Performed by: EMERGENCY MEDICINE

## 2024-12-16 NOTE — PATIENT INSTRUCTIONS
"Mom and baby here for continued follow up weight check and weighted feed post revision. Mom states she took baby to follow up appt. At [facility] and they were happy with how revision sites have healed. Mom states infant is nursing very well, she no longer has any pain or discomfort, but she likes to come to make certain baby is gaining weight and for weighted feeding for reassurance. Baby had wet and stool diaper changed before weight. Baby weighed prior to feed at 9 lb 7.8 oz which is a gain of 9.5 ounces in 1 week. Very good gain! Mom pleased. Observed mom independently latch infant without difficulty. Minimal assistance given to draw infant's chin down while sucking to obtain an even deeper latch. Mom states infant sometimes \"clicks\" while feeding. Discussed most likely cause of strong let down and suggested deeper laid back position to slow milk flow down to more tolerable rate for baby. Baby nursed well , minimal clicking noted. Baby weighed after first breast and transferred 3.2 oz per test weight! Baby burped and offer second side. Nursed in similar manner, but for a more brief period. Baby spontaneously released nipple. Nipple round when released. Baby weighed once again and transferred an additional 0.9 oz for a total of 4.1 oz this feeding. Wet and stool diaper changed once again. Baby content after feeding. Mom would like to come here again next week to check in before the holidays. Plans to return Monday 12-23-24. Answered questions regarding introducing bottle of expressed milk and paced bottle feeding.   "

## 2024-12-16 NOTE — PROGRESS NOTES
Lactation Counseling Note    Subjective:    Stacie Alston is a 19 y.o. patient referred for lactation counseling. She is here today due to Sore/cracked nipple(s) and Latch issues. She was referred by her  mother(former patient) .     OB HISTORY:   /Para: : 1  Para: 1    Objective:    BREASTFEEDING ASSESSMENT:   Physical Exam    Breast Assessment: Large, Pendulous, Full, Compressible, and Readiness to feed  Nipple Assessment/Stage: intact, erect, and rounded after feeding none  Areola: Normal  Feeding Position: baby - led, cradle, laid back, skin to skin, both sides, nipple to nose, and mother demonstrates good positioning  Latch: minimal assistance is needed, eagerly grasped on to latch, deep latch obtained, optimal angle of mouth opening, comfortable with no pain, sucking and swallowing, sucks with long jaw movement, chin and lower lip contact breast first, chin moves in rhythmic motion, and frequent audible swallows  Suck/Feeding: sustained, coordinated suck/swallow/breathe, baby led rhythmically, audible swallowing, and content after feeding  Infant Behavior: awake, readiness to feed, feeding cues observed, rooting response, sucking, and content after feeding  Infant Information: Post status frenotomy  Good cupping of tongue  Good lateral movement of the tongue  Able to elevate tongue to roof of mouth  Fontanel: flat  Mucous Membranes: moist  Breast Pain: none  Nipple Pain: none  Weight: Weight before feedin lb 7.8 oz  Weight after feedin lb 11.9 oz  Amount of breast milk transferred: 4.1 oz ml  Number of voids in the last 24 hours: 8  Number of stools in the last 24 hours: 6    PATIENT DISCUSSION SUMMARY: Mom and baby here for continued follow up weight check and weighted feed post revision. Mom states she took baby to follow up appt. At [facility] and they were happy with how revision sites have healed. Mom states infant is nursing very well, she no longer has any pain or  "discomfort, but she likes to come to make certain baby is gaining weight and for weighted feeding for reassurance. Baby had wet and stool diaper changed before weight. Baby weighed prior to feed at 9 lb 7.8 oz which is a gain of 9.5 ounces in 1 week. Very good gain! Mom pleased. Observed mom independently latch infant without difficulty. Minimal assistance given to draw infant's chin down while sucking to obtain an even deeper latch. Mom states infant sometimes \"clicks\" while feeding. Discussed most likely cause of strong let down and suggested deeper laid back position to slow milk flow down to more tolerable rate for baby. Baby nursed well , minimal clicking noted. Baby weighed after first breast and transferred 3.2 oz per test weight! Baby burped and offer second side. Nursed in similar manner, but for a more brief period. Baby spontaneously released nipple. Nipple round when released. Baby weighed once again and transferred an additional 0.9 oz for a total of 4.1 oz this feeding. Wet and stool diaper changed once again. Baby content after feeding. Mom would like to come here again next week to check in before the holidays. Plans to return Monday 12-23-24. Answered questions regarding introducing bottle of expressed milk and paced bottle feeding.     LACTATION PROBLEMS AND STRATEGIES:  Bottle feeding pointers when introduced  PATIENT INSTRUCTION HANDOUTS GIVEN:      LACTATION EDUCATION:  Correct Positioning and Correct Latch On                                                                                            "

## 2024-12-23 ENCOUNTER — LACTATION CONSULT (OUTPATIENT)
Dept: LACTATION | Facility: CLINIC | Age: 19
End: 2024-12-23
Payer: MEDICAID

## 2024-12-23 PROCEDURE — 99211 OFF/OP EST MAY X REQ PHY/QHP: CPT | Performed by: EMERGENCY MEDICINE

## 2024-12-26 NOTE — PATIENT INSTRUCTIONS
Mom here for follow-up weight check baby up 8 ounces in 7 days.  Mom denies any pain or problems.  Mom feels that the revision made a world of difference baby has no problems with latching she has no problems with pain or engorgement baby transferred 4.3 ounces at a brief period of time here today mom wants to come back next week just to see how baby is gaining after the holidays and I stated that would be fine.

## 2024-12-26 NOTE — PROGRESS NOTES
Lactation Counseling Note    Subjective:    Stacie Alston is a 19 y.o. patient referred for lactation counseling. She is here today due to  wt check . She was referred by her  mom .     OB HISTORY:   Mode of Delivery: Normal vaginal route    Objective:    BREASTFEEDING ASSESSMENT:   Physical Exam    Breast Assessment: Medium, Symmetrical, Full, Firm, Warm, and Compressible  Nipple Assessment/Stage: intact, short, and rounded after feeding    Areola: Normal  Feeding Position: cradle, skin to skin, both sides, and mother demonstrates good positioning  Latch: deep latch obtained, comfortable with no pain, sucking and swallowing, flanged lips, and comfortable latch  Suck/Feeding: sustained, audible swallowing, and content after feeding  Alternative Feeding Methods: nursing at the breast  Feeding and Cleaning instructions reviewed for: Paced bottle  Infant Feeding Method: Breast milk only  Infant Behavior: awake, active alert, fussy, feeding cues observed, and content after feeding  Infant Information: Post status frenotomy  Tongue protrudes over alveolar ridge  Good cupping of tongue  Good lateral movement of the tongue  Able to elevate tongue to roof of mouth  Nipple Pain: Pain scale 0-10 (10 most pain): 0  Weight: Weight before feedinlbs 15.5  Weight after feeding: 10lbs 3.8oz  Amount of breast milk transferred: 4.3oz ml    PATIENT DISCUSSION SUMMARY: Mom here for follow-up weight check baby up 8 ounces in 7 days.  Mom denies any pain or problems.  Mom feels that the revision made a world of difference baby has no problems with latching she has no problems with pain or engorgement baby transferred 4.3 ounces at a brief period of time here today mom wants to come back next week just to see how baby is gaining after the holidays and I stated that would be fine.    LACTATION PROBLEMS AND STRATEGIES:  Wt check  PATIENT INSTRUCTION HANDOUTS GIVEN:      LACTATION EDUCATION:  Correct Positioning and Correct Latch  On